# Patient Record
Sex: MALE | Race: BLACK OR AFRICAN AMERICAN | NOT HISPANIC OR LATINO | URBAN - METROPOLITAN AREA
[De-identification: names, ages, dates, MRNs, and addresses within clinical notes are randomized per-mention and may not be internally consistent; named-entity substitution may affect disease eponyms.]

---

## 2022-04-29 PROBLEM — Z00.00 ENCOUNTER FOR PREVENTIVE HEALTH EXAMINATION: Status: ACTIVE | Noted: 2022-04-29

## 2022-04-30 ENCOUNTER — INPATIENT (INPATIENT)
Facility: HOSPITAL | Age: 32
LOS: 2 days | Discharge: NURSING, REHAB OR LONG-TERM CARE | DRG: 306 | End: 2022-05-03
Attending: INTERNAL MEDICINE | Admitting: THORACIC SURGERY (CARDIOTHORACIC VASCULAR SURGERY)
Payer: MEDICAID

## 2022-04-30 VITALS
DIASTOLIC BLOOD PRESSURE: 66 MMHG | HEART RATE: 70 BPM | RESPIRATION RATE: 18 BRPM | OXYGEN SATURATION: 98 % | SYSTOLIC BLOOD PRESSURE: 123 MMHG

## 2022-04-30 LAB
ALBUMIN SERPL ELPH-MCNC: 3.7 G/DL — SIGNIFICANT CHANGE UP (ref 3.3–5)
ALP SERPL-CCNC: 71 U/L — SIGNIFICANT CHANGE UP (ref 40–120)
ALT FLD-CCNC: 109 U/L — HIGH (ref 10–45)
ANION GAP SERPL CALC-SCNC: 8 MMOL/L — SIGNIFICANT CHANGE UP (ref 5–17)
APPEARANCE UR: ABNORMAL
APTT BLD: 27.3 SEC — LOW (ref 27.5–35.5)
AST SERPL-CCNC: 72 U/L — HIGH (ref 10–40)
BASOPHILS # BLD AUTO: 0.04 K/UL — SIGNIFICANT CHANGE UP (ref 0–0.2)
BASOPHILS NFR BLD AUTO: 0.8 % — SIGNIFICANT CHANGE UP (ref 0–2)
BILIRUB SERPL-MCNC: 0.2 MG/DL — SIGNIFICANT CHANGE UP (ref 0.2–1.2)
BILIRUB UR-MCNC: NEGATIVE — SIGNIFICANT CHANGE UP
BLD GP AB SCN SERPL QL: NEGATIVE — SIGNIFICANT CHANGE UP
BLD GP AB SCN SERPL QL: NEGATIVE — SIGNIFICANT CHANGE UP
BUN SERPL-MCNC: 20 MG/DL — SIGNIFICANT CHANGE UP (ref 7–23)
CALCIUM SERPL-MCNC: 9.5 MG/DL — SIGNIFICANT CHANGE UP (ref 8.4–10.5)
CHLORIDE SERPL-SCNC: 104 MMOL/L — SIGNIFICANT CHANGE UP (ref 96–108)
CO2 SERPL-SCNC: 28 MMOL/L — SIGNIFICANT CHANGE UP (ref 22–31)
COLOR SPEC: YELLOW — SIGNIFICANT CHANGE UP
CREAT SERPL-MCNC: 1.13 MG/DL — SIGNIFICANT CHANGE UP (ref 0.5–1.3)
DIFF PNL FLD: NEGATIVE — SIGNIFICANT CHANGE UP
EGFR: 89 ML/MIN/1.73M2 — SIGNIFICANT CHANGE UP
EOSINOPHIL # BLD AUTO: 0.35 K/UL — SIGNIFICANT CHANGE UP (ref 0–0.5)
EOSINOPHIL NFR BLD AUTO: 6.8 % — HIGH (ref 0–6)
GLUCOSE SERPL-MCNC: 94 MG/DL — SIGNIFICANT CHANGE UP (ref 70–99)
GLUCOSE UR QL: NEGATIVE — SIGNIFICANT CHANGE UP
HCT VFR BLD CALC: 41.5 % — SIGNIFICANT CHANGE UP (ref 39–50)
HGB BLD-MCNC: 13.6 G/DL — SIGNIFICANT CHANGE UP (ref 13–17)
IMM GRANULOCYTES NFR BLD AUTO: 0.2 % — SIGNIFICANT CHANGE UP (ref 0–1.5)
INR BLD: 1.12 — SIGNIFICANT CHANGE UP (ref 0.88–1.16)
KETONES UR-MCNC: NEGATIVE — SIGNIFICANT CHANGE UP
LEUKOCYTE ESTERASE UR-ACNC: NEGATIVE — SIGNIFICANT CHANGE UP
LYMPHOCYTES # BLD AUTO: 1.85 K/UL — SIGNIFICANT CHANGE UP (ref 1–3.3)
LYMPHOCYTES # BLD AUTO: 36.2 % — SIGNIFICANT CHANGE UP (ref 13–44)
MCHC RBC-ENTMCNC: 28.1 PG — SIGNIFICANT CHANGE UP (ref 27–34)
MCHC RBC-ENTMCNC: 32.8 GM/DL — SIGNIFICANT CHANGE UP (ref 32–36)
MCV RBC AUTO: 85.7 FL — SIGNIFICANT CHANGE UP (ref 80–100)
MONOCYTES # BLD AUTO: 0.65 K/UL — SIGNIFICANT CHANGE UP (ref 0–0.9)
MONOCYTES NFR BLD AUTO: 12.7 % — SIGNIFICANT CHANGE UP (ref 2–14)
NEUTROPHILS # BLD AUTO: 2.21 K/UL — SIGNIFICANT CHANGE UP (ref 1.8–7.4)
NEUTROPHILS NFR BLD AUTO: 43.3 % — SIGNIFICANT CHANGE UP (ref 43–77)
NITRITE UR-MCNC: NEGATIVE — SIGNIFICANT CHANGE UP
NRBC # BLD: 0 /100 WBCS — SIGNIFICANT CHANGE UP (ref 0–0)
NT-PROBNP SERPL-SCNC: 15 PG/ML — SIGNIFICANT CHANGE UP (ref 0–300)
PH UR: 6 — SIGNIFICANT CHANGE UP (ref 5–8)
PLATELET # BLD AUTO: 170 K/UL — SIGNIFICANT CHANGE UP (ref 150–400)
POTASSIUM SERPL-MCNC: 4.2 MMOL/L — SIGNIFICANT CHANGE UP (ref 3.5–5.3)
POTASSIUM SERPL-SCNC: 4.2 MMOL/L — SIGNIFICANT CHANGE UP (ref 3.5–5.3)
PROT SERPL-MCNC: 6.9 G/DL — SIGNIFICANT CHANGE UP (ref 6–8.3)
PROT UR-MCNC: NEGATIVE MG/DL — SIGNIFICANT CHANGE UP
PROTHROM AB SERPL-ACNC: 13.3 SEC — SIGNIFICANT CHANGE UP (ref 10.5–13.4)
RBC # BLD: 4.84 M/UL — SIGNIFICANT CHANGE UP (ref 4.2–5.8)
RBC # FLD: 13.2 % — SIGNIFICANT CHANGE UP (ref 10.3–14.5)
RH IG SCN BLD-IMP: POSITIVE — SIGNIFICANT CHANGE UP
RH IG SCN BLD-IMP: POSITIVE — SIGNIFICANT CHANGE UP
SODIUM SERPL-SCNC: 140 MMOL/L — SIGNIFICANT CHANGE UP (ref 135–145)
SP GR SPEC: 1.02 — SIGNIFICANT CHANGE UP (ref 1–1.03)
TSH SERPL-MCNC: 2.97 UIU/ML — SIGNIFICANT CHANGE UP (ref 0.27–4.2)
UROBILINOGEN FLD QL: 0.2 E.U./DL — SIGNIFICANT CHANGE UP
WBC # BLD: 5.11 K/UL — SIGNIFICANT CHANGE UP (ref 3.8–10.5)
WBC # FLD AUTO: 5.11 K/UL — SIGNIFICANT CHANGE UP (ref 3.8–10.5)

## 2022-04-30 PROCEDURE — 93010 ELECTROCARDIOGRAM REPORT: CPT

## 2022-04-30 PROCEDURE — 71045 X-RAY EXAM CHEST 1 VIEW: CPT | Mod: 26

## 2022-04-30 RX ORDER — PANTOPRAZOLE SODIUM 20 MG/1
40 TABLET, DELAYED RELEASE ORAL
Refills: 0 | Status: DISCONTINUED | OUTPATIENT
Start: 2022-04-30 | End: 2022-05-03

## 2022-04-30 RX ORDER — ASPIRIN/CALCIUM CARB/MAGNESIUM 324 MG
81 TABLET ORAL DAILY
Refills: 0 | Status: DISCONTINUED | OUTPATIENT
Start: 2022-04-30 | End: 2022-05-03

## 2022-04-30 RX ORDER — SODIUM CHLORIDE 9 MG/ML
3 INJECTION INTRAMUSCULAR; INTRAVENOUS; SUBCUTANEOUS EVERY 8 HOURS
Refills: 0 | Status: DISCONTINUED | OUTPATIENT
Start: 2022-04-30 | End: 2022-05-03

## 2022-04-30 RX ORDER — MECLIZINE HCL 12.5 MG
25 TABLET ORAL EVERY 6 HOURS
Refills: 0 | Status: DISCONTINUED | OUTPATIENT
Start: 2022-04-30 | End: 2022-05-03

## 2022-04-30 RX ORDER — HEPARIN SODIUM 5000 [USP'U]/ML
5000 INJECTION INTRAVENOUS; SUBCUTANEOUS EVERY 8 HOURS
Refills: 0 | Status: DISCONTINUED | OUTPATIENT
Start: 2022-04-30 | End: 2022-05-03

## 2022-04-30 RX ADMIN — SODIUM CHLORIDE 3 MILLILITER(S): 9 INJECTION INTRAMUSCULAR; INTRAVENOUS; SUBCUTANEOUS at 22:00

## 2022-04-30 RX ADMIN — HEPARIN SODIUM 5000 UNIT(S): 5000 INJECTION INTRAVENOUS; SUBCUTANEOUS at 21:47

## 2022-04-30 NOTE — H&P ADULT - NS ATTEND AMEND GEN_ALL_CORE FT
Patient seen and examined with PA/fellow bedside and discussed during rounds.  PA/fellow note read, including vitals, physical findings, laboratory data, and radiological reports.   Revisions included below. Direct personal management at bedside and extensive interpretation of the data performed.  Plan was outlined and discussed in details with the multidisciplinary team.  Decision making of high complexity.     32 y/o M, , with no significant PMHx who presented to [facility] 7 days ago after experiencing an episode of tinnitus, dizziness, and vomiting when awaking from sleep. Pt wife called EMS who brought patient to [facility]. Reported similar event 7/8yrs ago that self resolved within mins/<1hr, however sx now persisted. Workup revealed: CT head in ED which was negative, repeat CTA H/N revealed acute infarct in left superior cerebellar artery territory, MRI confirmed acute infarct involving the left superior cerebellar artery territory, TTE/JAROD showing +PFO, nml structural heart, no thrombus. Patient remains with some mild speech difficulty, slight weakness/dyscoordinate on lt side. hospital course complicated by transaminitis after starting atorvastatin that improved upon d/c; pt admits to taking multiple OTC supplements including amio acids, protein, and creatinine. Denies travel, hx of heme, significant fhx outside of htn, palpitations. recent life event notable for birth of child, now 17d old.    -obtain imaging  -vascular neuro consultation; continue asa 81mg daily; hold statin for now  -heme evaluation w Dr. Floyd  -obtain LE u/s, carotid duplex, TTE wihout bubble study (large PFO identified on JAROD at [facility])  -will need [facility]OT upon d/c, continue telemtry  -PT/OT    I was physically present for the key portions of the evaluation and management (E/M) service provided.  I agree with the above history, physical, and plan which I have reviewed and edited where appropriate.     80 minutes spent on total encounter; more than 50% of the visit was spent counseling and/or coordinating care by the attending physician..

## 2022-04-30 NOTE — H&P ADULT - NSHPPHYSICALEXAM_GEN_ALL_CORE
PHYSICAL EXAM:  General: well appearing resting in bed in NAD   HEENT: normocephalic, atraumatic   Neurological: AOx3. Motor skills grossly intact. Coordination is diminished on left side per patient but no obvious on exam.   Cardiovascular: Normal S1/S2. Regular rate/rhythm. No murmurs  Respiratory: Lungs CTA bilaterally. No wheezing or rales  Gastrointestinal: +BS in all 4 quadrants. Non-distended. Soft. Non-tender  Extremities: Strength 5/5 b/l upper/lower extremities. Sensation grossly intact upper/lower extremities. No edema. No calf tenderness.  Vascular: Radial 2+bilaterally, DP 2+ b/l

## 2022-04-30 NOTE — H&P ADULT - ASSESSMENT
32 y/o M, , with no significant PMHx who presented to [facility] 7 days ago after experiencing an episode of tinnitus, dizziness, and vomiting when awaking from sleep. Pt wife called EMS who brought patient to [facility]. While there, patient underwent CT head in ED which was negative. However, subsequent CTA H/N revealed acute infarct in left superior cerebellar artery territory. Pt was admitted to ICU for closer monitoring. MRI confirmed acute infarct involving the left superior cerebellar artery territory. Pt also treated for vertigo symptoms. Further w/u revealed +PFO confirmed by ECHO with bubble, EF 60-65%. JAROD positive for PFO, no thrombus. Of note, pt developed transaminitis after starting atorvastatin for CVA ppx. Pt admits to taking multiple OTC supplements including amio acids, protein, and creatinine. LFTs downtrended after stopping atorvastatin. Given +PFO with CVA, pt transferred to [facility] for further evaluation for possible intervention.    Plan:    Neurovascular:   -Acute Stroke: likely 2/2 to PFO finding     -no motor deficits, but patient states that he feels he has poor coordination on the left side of the body     -confirmed by CTA head & MRI at OSH (imaging report in OSH in chart)\    -continue with ASA    -holding off on atorvastatin for now given elevated LFTs at OSH, trend here and restart once appropriate     Cardiovascular:   +PFO    -found by both TTE and JAROD at OSH    -pending repeat TTE here    -f/u further planning with Dr. Delgado     -continue with ASA   -Hemodynamically stable.   -Monitor: BP, HR, tele    Respiratory:   -Oxygenating well on room air  -Encourage continued use of IS 10x/hr and frequent ambulation  -CXR: pending admission CXR     GI:  -transaminitis at OSH: started once started atorvastatin, f/u when to restart, educate patient not to take amio acid, creatinine, and extra protein supplements.   -GI PPX: pantoprazole 40mg daily   -PO Diet: DASH/TLC   -Bowel Regimen: having regular BMs, none needed at this time     Renal / :  -Continue to monitor renal function: BUN/Cr 20/1.13   -Monitor I/O's daily     Endocrine:    -No hx of DM or thyroid dx  -A1c: pending   -TSH: pending     Hematologic:  -CBC: H/H- 13/42  -Coagulation Panel: WNL     ID:  -Temperature: afebrile   -CBC: WBC- 5.11  -Continue to observe for SIRS/Sepsis Syndrome.    Prophylaxis:  -DVT prophylaxis with 5000 SubQ Heparin q8h.  -Continue with SCD's b/l while patient is at rest     Disposition:  -Pending further w/u for possible intervention

## 2022-04-30 NOTE — PATIENT PROFILE ADULT - FALL HARM RISK - HARM RISK INTERVENTIONS
Assistance with ambulation/Assistance OOB with selected safe patient handling equipment/Communicate Risk of Fall with Harm to all staff/Discuss with provider need for PT consult/Monitor gait and stability/Reinforce activity limits and safety measures with patient and family/Tailored Fall Risk Interventions/Visual Cue: Yellow wristband and red socks/Bed in lowest position, wheels locked, appropriate side rails in place/Call bell, personal items and telephone in reach/Instruct patient to call for assistance before getting out of bed or chair/Non-slip footwear when patient is out of bed/[place] to call system/Physically safe environment - no spills, clutter or unnecessary equipment/Purposeful Proactive Rounding/Room/bathroom lighting operational, light cord in reach

## 2022-04-30 NOTE — H&P ADULT - NSHPSOCIALHISTORY_GEN_ALL_CORE
No tobacco use  ETOH: 1 drink / 2 months  Other Drugs: Marijuana use a few times monthly  Lives with wife/son in home  No assistive devices, independet with ADLs

## 2022-04-30 NOTE — H&P ADULT - HISTORY OF PRESENT ILLNESS
30 y/o M, , with no significant PMHx who presented to [facility] 7 days ago after experiencing an episode of tinnitus, dizziness, and vomiting when awaking from sleep. Patient's wife called EMS who brought patient to [facility]. While there, patient underwent CT head in ED which was negative. However, subsequent CTA H/N revealed acute infarct in left superior cerebellar artery territory. Pt was admitted to ICU for closer monitoring. MRI confirmed acute infarct involving the left superior cerebellar artery territory. Pt also treated for vertigo symptoms. Further w/u revealed +PFO confirmed by ECHO with bubble, EF 60-65%. Transitioned to tele floor. Neurology following patient and recommended JAROD which was positive for PFO, no thrombus. Of note, pt developed transaminitis after starting atorvastatin for CVA ppx. Pt admits to taking multiple OTC supplements including amio acids, protein, and creatinine. LFTs downtrended after stopping atorvastatin. Given +PFO with CVA, pt transferred to [facility] for further evaluation for possible intervention. On arrival, pt states he still feels he has residual left sided coordination both with holding objects and with walking but normal strength. Otherwise, pt feels well and denies any CP, palpitations, SOB, wheezing, abd pain, n/v/d/c, fevers or chills.     ICU Vital Signs Last 24 Hrs  T(C): 36.9 (30 Apr 2022 18:16), Max: 36.9 (30 Apr 2022 18:16)  T(F): 98.4 (30 Apr 2022 18:16), Max: 98.4 (30 Apr 2022 18:16)  HR: --  BP: --  BP(mean): --  ABP: --  ABP(mean): --  RR: --  SpO2: --

## 2022-04-30 NOTE — H&P ADULT - NSHPREVIEWOFSYSTEMS_GEN_ALL_CORE
Review of Systems  CONSTITUTIONAL:  Denies Fevers / chills, sweats, fatigue, weight loss, weight gain                                      NEURO:  +coordination changes, Denies changes in sensation, seizures, syncope, confusion                                                  EYES:  Denies Blurry vision, discharge, pain, loss of vision                                                                                    ENMT:  Denies Difficulty hearing, vertigo, dysphagia, epistaxis, recent dental work                                       CV:  Denies Chest pain, palpitations, REYNA, orthopnea                                                                                          RESPIRATORY:  Denies Wheezing, SOB, cough / sputum, hemoptysis                                                                GI:  Denies Nausea, vomiting, diarrhea, constipation, melena, difficulty swallowing                                               : Denies Hematuria, dysuria, urgency, incontinence                                                                                         MUSCULOSKELETAL:  Denies arthritis, joint swelling, muscle weakness                                                             SKIN/BREAST:  Denies rash, itching, hair loss, masses                                                                                            PSYCH:  Denies depression, anxiety, suicidal ideation                                                                                               HEME/LYMPH:  Denies bruises easily, enlarged lymph nodes, tender lymph nodes                                        ENDOCRINE:  Denies cold intolerance, heat intolerance, polydipsia

## 2022-05-01 DIAGNOSIS — I63.9 CEREBRAL INFARCTION, UNSPECIFIED: ICD-10-CM

## 2022-05-01 DIAGNOSIS — Q21.1 ATRIAL SEPTAL DEFECT: ICD-10-CM

## 2022-05-01 LAB
ALBUMIN SERPL ELPH-MCNC: 3.7 G/DL — SIGNIFICANT CHANGE UP (ref 3.3–5)
ALP SERPL-CCNC: 66 U/L — SIGNIFICANT CHANGE UP (ref 40–120)
ALT FLD-CCNC: 103 U/L — HIGH (ref 10–45)
ANION GAP SERPL CALC-SCNC: 8 MMOL/L — SIGNIFICANT CHANGE UP (ref 5–17)
AST SERPL-CCNC: 59 U/L — HIGH (ref 10–40)
BASOPHILS # BLD AUTO: 0.05 K/UL — SIGNIFICANT CHANGE UP (ref 0–0.2)
BASOPHILS NFR BLD AUTO: 0.9 % — SIGNIFICANT CHANGE UP (ref 0–2)
BILIRUB SERPL-MCNC: 0.3 MG/DL — SIGNIFICANT CHANGE UP (ref 0.2–1.2)
BUN SERPL-MCNC: 15 MG/DL — SIGNIFICANT CHANGE UP (ref 7–23)
CALCIUM SERPL-MCNC: 9.4 MG/DL — SIGNIFICANT CHANGE UP (ref 8.4–10.5)
CHLORIDE SERPL-SCNC: 103 MMOL/L — SIGNIFICANT CHANGE UP (ref 96–108)
CO2 SERPL-SCNC: 29 MMOL/L — SIGNIFICANT CHANGE UP (ref 22–31)
CREAT SERPL-MCNC: 1.15 MG/DL — SIGNIFICANT CHANGE UP (ref 0.5–1.3)
EGFR: 87 ML/MIN/1.73M2 — SIGNIFICANT CHANGE UP
EOSINOPHIL # BLD AUTO: 0.41 K/UL — SIGNIFICANT CHANGE UP (ref 0–0.5)
EOSINOPHIL NFR BLD AUTO: 7.6 % — HIGH (ref 0–6)
GLUCOSE SERPL-MCNC: 93 MG/DL — SIGNIFICANT CHANGE UP (ref 70–99)
HCT VFR BLD CALC: 39.3 % — SIGNIFICANT CHANGE UP (ref 39–50)
HGB BLD-MCNC: 13.2 G/DL — SIGNIFICANT CHANGE UP (ref 13–17)
IMM GRANULOCYTES NFR BLD AUTO: 0.4 % — SIGNIFICANT CHANGE UP (ref 0–1.5)
LYMPHOCYTES # BLD AUTO: 1.65 K/UL — SIGNIFICANT CHANGE UP (ref 1–3.3)
LYMPHOCYTES # BLD AUTO: 30.7 % — SIGNIFICANT CHANGE UP (ref 13–44)
MCHC RBC-ENTMCNC: 28.4 PG — SIGNIFICANT CHANGE UP (ref 27–34)
MCHC RBC-ENTMCNC: 33.6 GM/DL — SIGNIFICANT CHANGE UP (ref 32–36)
MCV RBC AUTO: 84.7 FL — SIGNIFICANT CHANGE UP (ref 80–100)
MONOCYTES # BLD AUTO: 0.64 K/UL — SIGNIFICANT CHANGE UP (ref 0–0.9)
MONOCYTES NFR BLD AUTO: 11.9 % — SIGNIFICANT CHANGE UP (ref 2–14)
NEUTROPHILS # BLD AUTO: 2.6 K/UL — SIGNIFICANT CHANGE UP (ref 1.8–7.4)
NEUTROPHILS NFR BLD AUTO: 48.5 % — SIGNIFICANT CHANGE UP (ref 43–77)
NRBC # BLD: 0 /100 WBCS — SIGNIFICANT CHANGE UP (ref 0–0)
PLATELET # BLD AUTO: 165 K/UL — SIGNIFICANT CHANGE UP (ref 150–400)
POTASSIUM SERPL-MCNC: 4 MMOL/L — SIGNIFICANT CHANGE UP (ref 3.5–5.3)
POTASSIUM SERPL-SCNC: 4 MMOL/L — SIGNIFICANT CHANGE UP (ref 3.5–5.3)
PROT SERPL-MCNC: 6.6 G/DL — SIGNIFICANT CHANGE UP (ref 6–8.3)
RBC # BLD: 4.64 M/UL — SIGNIFICANT CHANGE UP (ref 4.2–5.8)
RBC # FLD: 13.4 % — SIGNIFICANT CHANGE UP (ref 10.3–14.5)
SARS-COV-2 RNA SPEC QL NAA+PROBE: SIGNIFICANT CHANGE UP
SODIUM SERPL-SCNC: 140 MMOL/L — SIGNIFICANT CHANGE UP (ref 135–145)
WBC # BLD: 5.37 K/UL — SIGNIFICANT CHANGE UP (ref 3.8–10.5)
WBC # FLD AUTO: 5.37 K/UL — SIGNIFICANT CHANGE UP (ref 3.8–10.5)

## 2022-05-01 PROCEDURE — 99253 IP/OBS CNSLTJ NEW/EST LOW 45: CPT | Mod: GC

## 2022-05-01 PROCEDURE — 99221 1ST HOSP IP/OBS SF/LOW 40: CPT

## 2022-05-01 RX ADMIN — SODIUM CHLORIDE 3 MILLILITER(S): 9 INJECTION INTRAMUSCULAR; INTRAVENOUS; SUBCUTANEOUS at 06:01

## 2022-05-01 RX ADMIN — SODIUM CHLORIDE 3 MILLILITER(S): 9 INJECTION INTRAMUSCULAR; INTRAVENOUS; SUBCUTANEOUS at 14:36

## 2022-05-01 RX ADMIN — HEPARIN SODIUM 5000 UNIT(S): 5000 INJECTION INTRAVENOUS; SUBCUTANEOUS at 21:50

## 2022-05-01 RX ADMIN — HEPARIN SODIUM 5000 UNIT(S): 5000 INJECTION INTRAVENOUS; SUBCUTANEOUS at 14:26

## 2022-05-01 RX ADMIN — SODIUM CHLORIDE 3 MILLILITER(S): 9 INJECTION INTRAMUSCULAR; INTRAVENOUS; SUBCUTANEOUS at 21:51

## 2022-05-01 RX ADMIN — PANTOPRAZOLE SODIUM 40 MILLIGRAM(S): 20 TABLET, DELAYED RELEASE ORAL at 06:01

## 2022-05-01 RX ADMIN — HEPARIN SODIUM 5000 UNIT(S): 5000 INJECTION INTRAVENOUS; SUBCUTANEOUS at 06:01

## 2022-05-01 RX ADMIN — Medication 81 MILLIGRAM(S): at 12:13

## 2022-05-01 NOTE — CONSULT NOTE ADULT - SUBJECTIVE AND OBJECTIVE BOX
HPI:  30 y/o M, , with no significant PMHx who presented to [facility] 7 days ago after experiencing an episode of tinnitus, dizziness, and vomiting when awaking from sleep. Patient's wife called EMS who brought patient to [facility]. While there, patient underwent CT head in ED which was negative. However, subsequent CTA H/N revealed acute infarct in left superior cerebellar artery territory. Pt was admitted to ICU for closer monitoring. MRI confirmed acute infarct involving the left superior cerebellar artery territory. Pt also treated for vertigo symptoms. Further w/u revealed +PFO confirmed by ECHO with bubble, EF 60-65%. Transitioned to tele floor. Neurology following patient and recommended JAROD which was positive for PFO, no thrombus. Of note, pt developed transaminitis after starting atorvastatin for CVA ppx. Pt admits to taking multiple OTC supplements including amio acids, protein, and creatinine. LFTs downtrended after stopping atorvastatin. Given +PFO with CVA, pt transferred to [facility] for further evaluation for possible intervention. On arrival, pt states he still feels he has residual left sided coordination both with holding objects and with walking but normal strength. Otherwise, pt feels well and denies any CP, palpitations, SOB, wheezing, abd pain, n/v/d/c, fevers or chills.     ICU Vital Signs Last 24 Hrs  T(C): 36.9 (2022 18:16), Max: 36.9 (2022 18:16)  T(F): 98.4 (2022 18:16), Max: 98.4 (2022 18:16)  HR: --  BP: --  BP(mean): --  ABP: --  ABP(mean): --  RR: --  SpO2: --   (2022 18:21)      PAST MEDICAL & SURGICAL HISTORY:  No pertinent past medical history    No significant past surgical history         Review of Systems:  · General	negative  · Skin/Breast	negative  · Ophthalmologic	negative  · ENMT	negative  · Respiratory and Thorax	negative  · Cardiovascular	negative  · Gastrointestinal	negative  · Genitourinary	negative  · Musculoskeletal Comments	negative  · Neurological	negative      MEDICATIONS  (STANDING):  aspirin enteric coated 81 milliGRAM(s) Oral daily  heparin   Injectable 5000 Unit(s) SubCutaneous every 8 hours  pantoprazole    Tablet 40 milliGRAM(s) Oral before breakfast  sodium chloride 0.9% lock flush 3 milliLiter(s) IV Push every 8 hours    MEDICATIONS  (PRN):  meclizine 25 milliGRAM(s) Oral every 6 hours PRN Dizziness      Allergies    Kiwi (Unknown)  No Known Drug Allergies  peanuts (Unknown)    Intolerances        SOCIAL HISTORY:    FAMILY HISTORY:  No pertinent family history in first degree relatives        Vital Signs Last 24 Hrs  T(C): 37.1 (01 May 2022 18:16), Max: 37.1 (01 May 2022 18:16)  T(F): 98.7 (01 May 2022 18:16), Max: 98.7 (01 May 2022 18:16)  HR: 68 (01 May 2022 18:15) (62 - 75)  BP: 119/55 (01 May 2022 18:15) (114/67 - 133/64)  BP(mean): --  RR: 16 (01 May 2022 18:15) (16 - 18)  SpO2: 97% (01 May 2022 18:15) (97% - 98%)     Physical Exam:  · Constitutional	detailed exam  · Constitutional Details	well-developed; well-groomed  · Eyes	EOMI; PERRL; no drainage or redness  · ENMT Comments	dry mucous membranes  · Respiratory	detailed exam  · Respiratory Comments	normal breath sounds at the lung bases bilaterally  · Cardiovascular	Regular rate & rhythm, normal S1, S2; no murmurs, gallops or rubs; no S3, S4  · Abd-Soft non tender  ·Ext-no edema, clubbing or cyanosis      LABS:                        13.2   5.37  )-----------( 165      ( 01 May 2022 07:08 )             39.3     05-    140  |  103  |  15  ----------------------------<  93  4.0   |  29  |  1.15    Ca    9.4      01 May 2022 07:08    TPro  6.6  /  Alb  3.7  /  TBili  0.3  /  DBili  x   /  AST  59<H>  /  ALT  103<H>  /  AlkPhos  66  05-01    PT/INR - ( 2022 18:10 )   PT: 13.3 sec;   INR: 1.12          PTT - ( 2022 18:10 )  PTT:27.3 sec  Urinalysis Basic - ( 2022 20:24 )    Color: Yellow / Appearance: SL Cloudy / S.020 / pH: x  Gluc: x / Ketone: NEGATIVE  / Bili: Negative / Urobili: 0.2 E.U./dL   Blood: x / Protein: NEGATIVE mg/dL / Nitrite: NEGATIVE   Leuk Esterase: NEGATIVE / RBC: x / WBC x   Sq Epi: x / Non Sq Epi: x / Bacteria: x        RADIOLOGY & ADDITIONAL STUDIES:

## 2022-05-01 NOTE — OCCUPATIONAL THERAPY INITIAL EVALUATION ADULT - DIAGNOSIS, OT EVAL
MRS 4. Pt p/w slight facial dropping (decrease L cheeck puff) reporting slight ringing or icnrease HA when looking left, demonstrating decrease coordination ( L UE, LE) balance and strength affecting ADLs and functional mobility.

## 2022-05-01 NOTE — PROGRESS NOTE ADULT - SUBJECTIVE AND OBJECTIVE BOX
Patient discussed on morning rounds with Dr. Delgado    Pre-op Evaluation for Possible PFO closure     SUBJECTIVE ASSESSMENT:  Pt feels well today, no complaints. Eating/drinking well. Moving bowels regularly. Denies any CP, palpitations, SOB, wheezing, abd pain, n/v/d/c, fevers or chills.    Vital Signs Last 24 Hrs  T(C): 37.1 (01 May 2022 18:16), Max: 37.1 (01 May 2022 18:16)  T(F): 98.7 (01 May 2022 18:16), Max: 98.7 (01 May 2022 18:16)  HR: 68 (01 May 2022 18:15) (62 - 75)  BP: 119/55 (01 May 2022 18:15) (114/67 - 133/64)  BP(mean): --  RR: 16 (01 May 2022 18:15) (16 - 18)  SpO2: 97% (01 May 2022 18:15) (97% - 98%)  I&O's Detail    2022 07:01  -  01 May 2022 07:00  --------------------------------------------------------  IN:    Oral Fluid: 240 mL  Total IN: 240 mL    OUT:    Voided (mL): 400 mL  Total OUT: 400 mL    Total NET: -160 mL      01 May 2022 07:01  -  01 May 2022 20:04  --------------------------------------------------------  IN:    Oral Fluid: 240 mL  Total IN: 240 mL    OUT:    Voided (mL): 600 mL  Total OUT: 600 mL    Total NET: -360 mL    CHEST TUBE:  no  RAUL DRAIN:  no  EPICARDIAL WIRES: no  TIE DOWNS: no  HAMILTON: no    Physical Exam:  General: well appearing resting in bed in NAD   HEENT: normocephalic, atraumatic   Neurological: AOx3. Motor skills grossly intact. Coordination is diminished on left side per patient but no obvious on exam.   Cardiovascular: Normal S1/S2. Regular rate/rhythm. No murmurs  Respiratory: Lungs CTA bilaterally. No wheezing or rales  Gastrointestinal: +BS in all 4 quadrants. Non-distended. Soft. Non-tender  Extremities: Strength 5/5 b/l upper/lower extremities. Sensation grossly intact upper/lower extremities. No edema. No calf tenderness.  Vascular: Radial 2+bilaterally, DP 2+ b/l    LABS:                        13.2   5.37  )-----------( 165      ( 01 May 2022 07:08 )             39.3       COUMADIN:  no    PT/INR - ( 2022 18:10 )   PT: 13.3 sec;   INR: 1.12    PTT - ( 2022 18:10 )  PTT:27.3 sec        140  |  103  |  15  ----------------------------<  93  4.0   |  29  |  1.15    Ca    9.4      01 May 2022 07:08    TPro  6.6  /  Alb  3.7  /  TBili  0.3  /  DBili  x   /  AST  59<H>  /  ALT  103<H>  /  AlkPhos  66  05-      Urinalysis Basic - ( 2022 20:24 )    Color: Yellow / Appearance: SL Cloudy / S.020 / pH: x  Gluc: x / Ketone: NEGATIVE  / Bili: Negative / Urobili: 0.2 E.U./dL   Blood: x / Protein: NEGATIVE mg/dL / Nitrite: NEGATIVE   Leuk Esterase: NEGATIVE / RBC: x / WBC x   Sq Epi: x / Non Sq Epi: x / Bacteria: x        MEDICATIONS  (STANDING):  aspirin enteric coated 81 milliGRAM(s) Oral daily  heparin   Injectable 5000 Unit(s) SubCutaneous every 8 hours  pantoprazole    Tablet 40 milliGRAM(s) Oral before breakfast  sodium chloride 0.9% lock flush 3 milliLiter(s) IV Push every 8 hours    MEDICATIONS  (PRN):  meclizine 25 milliGRAM(s) Oral every 6 hours PRN Dizziness    RADIOLOGY & ADDITIONAL TESTS:  < from: Xray Chest 1 View AP/PA (22 @ 18:49) >  IMPRESSION: No acute infiltrates  < end of copied text >

## 2022-05-01 NOTE — PROGRESS NOTE ADULT - ASSESSMENT
32 y/o M, , with no significant PMHx who presented to [facility] 7 days ago after experiencing an episode of tinnitus, dizziness, and vomiting when awaking from sleep. Pt wife called EMS who brought patient to [facility]. While there, patient underwent CT head in ED which was negative. However, subsequent CTA H/N revealed acute infarct in left superior cerebellar artery territory. Pt was admitted to ICU for closer monitoring. MRI confirmed acute infarct involving the left superior cerebellar artery territory. Pt also treated for vertigo symptoms. Further w/u revealed +PFO confirmed by ECHO with bubble, EF 60-65%. JAROD positive for PFO, no thrombus. Of note, pt developed transaminitis after starting atorvastatin for CVA ppx. Pt admits to taking multiple OTC supplements including amio acids, protein, and creatinine. LFTs downtrended after stopping atorvastatin. Given +PFO with CVA, pt transferred to [facility] for further evaluation for possible intervention.    Plan:  Neurovascular:   -Acute CVA: possibly 2/2 to PFO finding     -pending w/u to r/o other causes: hypercoagulability w/u ordered, vascular consulted but pending carotid duplex first to r/o carotid dx     -no motor deficits, but patient states that he feels he has poor coordination on the left side of the body     -confirmed by CTA head & MRI at OSH (imaging report in OSH in chart)\    -continue with ASA    -Consulted stroke team, appreciate recommendations     -recommending ASA, plavix, and statin but per Dr. Delgado will follow up regarding plavix and statin Marcello     Cardiovascular:   +PFO    -found by both TTE and JAROD at OSH    -pending repeat TTE here w/o bubble (ordered)    -likely outpatient PFO closure     -f/u further planning with Dr. Delgado     -continue with ASA   -Hemodynamically stable.   -Monitor: BP, HR, tele    Respiratory:   -Oxygenating well on room air  -Encourage continued use of IS 10x/hr and frequent ambulation  -CXR: pending admission CXR     GI:  -transaminitis at OSH: started once started atorvastatin, f/u when to restart, educate patient not to take amio acid, creatinine, and extra protein supplements.   -GI PPX: pantoprazole 40mg daily   -PO Diet: DASH/TLC   -Bowel Regimen: having regular BMs, none needed at this time     Renal / :  -Continue to monitor renal function: BUN/Cr 15/1.15  -Monitor I/O's daily     Endocrine:    -No hx of DM or thyroid dx  -A1c: pending   -TSH: pending     Hematologic:  -CBC: H/H- 13/39  -Coagulation Panel: WNL     ID:  -Temperature: afebrile   -CBC: WBC- 5.3  -Continue to observe for SIRS/Sepsis Syndrome.    Prophylaxis:  -DVT prophylaxis with 5000 SubQ Heparin q8h.  -Continue with SCD's b/l while patient is at rest     Disposition:  -Pending further w/u for possible intervention  32 y/o M, , with no significant PMHx who presented to [facility] 7 days ago after experiencing an episode of tinnitus, dizziness, and vomiting when awaking from sleep. Pt wife called EMS who brought patient to [facility]. While there, patient underwent CT head in ED which was negative. However, subsequent CTA H/N revealed acute infarct in left superior cerebellar artery territory. Pt was admitted to ICU for closer monitoring. MRI confirmed acute infarct involving the left superior cerebellar artery territory. Pt also treated for vertigo symptoms. Further w/u revealed +PFO confirmed by ECHO with bubble, EF 60-65%. JAROD positive for PFO, no thrombus. Of note, pt developed transaminitis after starting atorvastatin for CVA ppx. Pt admits to taking multiple OTC supplements including amio acids, protein, and creatinine. LFTs downtrended after stopping atorvastatin. Given +PFO with CVA, pt transferred to [facility] for further evaluation for possible intervention.    Plan:  Neurovascular:   -Acute CVA: possibly 2/2 to PFO finding     -pending w/u to r/o other causes: hypercoagulability w/u ordered, vascular consulted but pending carotid duplex first to r/o carotid dx     -no motor deficits, but patient states that he feels he has poor coordination on the left side of the body     -confirmed by CTA head & MRI at OSH (imaging report in OSH in chart)    -continue with ASA    -Consulted stroke team, appreciate recommendations     -recommending ASA, plavix, and statin but per Dr. Delgado will follow up regarding plavix and statin Marcello     Cardiovascular:   +PFO    -found by both TTE and JAROD at OSH    -pending repeat TTE here w/o bubble (ordered)    -likely outpatient PFO closure     -f/u further planning with Dr. Delgado     -continue with ASA   -Hemodynamically stable.   -Monitor: BP, HR, tele    Respiratory:   -Oxygenating well on room air  -Encourage continued use of IS 10x/hr and frequent ambulation  -CXR: pending admission CXR     GI:  -transaminitis at OSH: started once started atorvastatin, f/u when to restart, educate patient not to take amio acid, creatinine, and extra protein supplements.   -GI PPX: pantoprazole 40mg daily   -PO Diet: DASH/TLC   -Bowel Regimen: having regular BMs, none needed at this time     Renal / :  -Continue to monitor renal function: BUN/Cr 15/1.15  -Monitor I/O's daily     Endocrine:    -No hx of DM or thyroid dx  -A1c: pending   -TSH: pending     Hematologic:  -CBC: H/H- 13/39  -Coagulation Panel: WNL     ID:  -Temperature: afebrile   -CBC: WBC- 5.3  -Continue to observe for SIRS/Sepsis Syndrome.    Prophylaxis:  -DVT prophylaxis with 5000 SubQ Heparin q8h.  -Continue with SCD's b/l while patient is at rest     Disposition:  -Pending further w/u for possible intervention

## 2022-05-01 NOTE — CONSULT NOTE ADULT - SUBJECTIVE AND OBJECTIVE BOX
Vascular Attending:  Chuck      HPI:  30 y/o M, , with no significant PMHx who presented to [facility] 7 days ago after experiencing an episode of tinnitus, dizziness, and vomiting when awaking from sleep. Patient's wife called EMS who brought patient to [facility]. While there, patient underwent CT head in ED which was negative. However, subsequent CTA H/N revealed acute infarct in left superior cerebellar artery territory. Pt was admitted to ICU for closer monitoring. MRI confirmed acute infarct involving the left superior cerebellar artery territory. Pt also treated for vertigo symptoms. Further w/u revealed +PFO confirmed by ECHO with bubble, EF 60-65%. Transitioned to tele floor. Neurology following patient and recommended JAROD which was positive for PFO, no thrombus. Of note, pt developed transaminitis after starting atorvastatin for CVA ppx. Pt admits to taking multiple OTC supplements including amio acids, protein, and creatinine. LFTs downtrended after stopping atorvastatin. Given +PFO with CVA, pt transferred to [facility] for further evaluation for possible intervention. On arrival, pt states he still feels he has residual left sided coordination both with holding objects and with walking but normal strength. Otherwise, pt feels well and denies any CP, palpitations, SOB, wheezing, abd pain, n/v/d/c, fevers or chills.     Vascular Addendum:   Patient claims that he had a similar episode of tinnitus, dizziness and nausea about 8 years ago that resolved after about 3 hours, and was not associated with trouble with walking/coordination or changes in his speech as with this episode. He says his father has chronic vertigo but he denies any family history of strokes, blood clots, bleeding disorders, trauma, long travel. He also says that he had a LE Duplex US during his stay at [facility] that was negative for DVT.       PAST MEDICAL & SURGICAL HISTORY:  No pertinent past medical history    No significant past surgical history      MEDICATIONS  (STANDING):  aspirin enteric coated 81 milliGRAM(s) Oral daily  heparin   Injectable 5000 Unit(s) SubCutaneous every 8 hours  pantoprazole    Tablet 40 milliGRAM(s) Oral before breakfast  sodium chloride 0.9% lock flush 3 milliLiter(s) IV Push every 8 hours    MEDICATIONS  (PRN):  meclizine 25 milliGRAM(s) Oral every 6 hours PRN Dizziness      Allergies    Kiwi (Unknown)  No Known Drug Allergies  peanuts (Unknown)    Intolerances        SOCIAL HISTORY:    FAMILY HISTORY:  No pertinent family history in first degree relatives        Vital Signs Last 24 Hrs  T(C): 36.4 (01 May 2022 10:00), Max: 37 (01 May 2022 06:50)  T(F): 97.5 (01 May 2022 10:00), Max: 98.6 (01 May 2022 06:50)  HR: 75 (01 May 2022 14:15) (62 - 75)  BP: 133/64 (01 May 2022 14:15) (114/67 - 133/64)  BP(mean): --  RR: 16 (01 May 2022 14:15) (16 - 18)  SpO2: 97% (01 May 2022 14:15) (97% - 98%)    PHYSICAL EXAM:    General: well appearing resting in bed in NAD   HEENT: normocephalic, atraumatic   Neurological: AOx3. Motor skills grossly intact. Coordination is diminished on left side per patient but no obvious on exam.   Cardiovascular: Normal S1/S2. Regular rate/rhythm. No murmurs  Respiratory: Lungs CTA bilaterally. No wheezing or rales  Gastrointestinal: +BS in all 4 quadrants. Non-distended. Soft. Non-tender  Extremities: Strength 5/5 b/l upper/lower extremities. Sensation grossly intact upper/lower extremities. No edema. No calf tenderness.  Vascular: Radial 2+bilaterally, DP/PT 2+ No carotid bruits  Neuro: Awake, alert, oriented to person, place, and time. Speech is fluent with intact naming, repetition, and comprehension, no dysarthria, + stuttering speech when speaking longer sentences at times. Dysmetria present on Left side    LABS:                        13.2   5.37  )-----------( 165      ( 01 May 2022 07:08 )             39.3     05-    140  |  103  |  15  ----------------------------<  93  4.0   |  29  |  1.15    Ca    9.4      01 May 2022 07:08    TPro  6.6  /  Alb  3.7  /  TBili  0.3  /  DBili  x   /  AST  59<H>  /  ALT  103<H>  /  AlkPhos  66  05-    PT/INR - ( 2022 18:10 )   PT: 13.3 sec;   INR: 1.12          PTT - ( 2022 18:10 )  PTT:27.3 sec  Urinalysis Basic - ( 2022 20:24 )    Color: Yellow / Appearance: SL Cloudy / S.020 / pH: x  Gluc: x / Ketone: NEGATIVE  / Bili: Negative / Urobili: 0.2 E.U./dL   Blood: x / Protein: NEGATIVE mg/dL / Nitrite: NEGATIVE   Leuk Esterase: NEGATIVE / RBC: x / WBC x   Sq Epi: x / Non Sq Epi: x / Bacteria: x        RADIOLOGY & ADDITIONAL STUDIES

## 2022-05-01 NOTE — CONSULT NOTE ADULT - ASSESSMENT
31y Male, , with no PMHx presented to [facility] on 4/23 after experiencing an episode of acute R ear tinnitus, dizziness, and vomiting when awaking from sleep, found to have acute to subacute left SCA infarct on CTA h/n and MRI with residual left sided dysmetria and ataxia, c/b transaminitis after starting statin, transferred to [facility] under CTSx for +PFO found on JAROD. Stroke consulted for further management.     Recommend:   1)Secondary stroke prevention  - continue ASA 81mg PO daily and start Plavix 75mg PO daily if not planned for PFO repair this admission  - start Atorvastatin 80mg PO daily if LFTs are < x3 the upper limit as pt is still within subacute stroke timeline. can recheck LFTs 2 days after initiating treatment to monitor LFTs.     2) Stroke risk factors  - A1C: pending  - LDL: please obtain  - T[facility]: 3.049 [facility]    3) Further management  - please obtain [facility] imaging  - [facility] MRI brain without contrast 4/24: acute to subacute infarct in left cerebellum   - recommend SBP goal <160  - recommend q4hr stroke neuro checks  - please obtain hypercoagulable panel  - may need outpt neurology follow up  - may require outpt heart monitor  - provide stroke education    DVT prophylaxis   -Lovenox SQ and SCDs    Discussed with Neurology Attending Dr. Chin 31y Male, , with no PMHx presented to [facility] on 4/23 after experiencing an episode of acute R ear tinnitus, dizziness, and vomiting when awaking from sleep, found to have acute to subacute left SCA infarct on CTA h/n and MRI with residual left sided dysmetria and ataxia, c/b transaminitis after starting statin, transferred to [facility] under CTSx for +PFO found on JAROD. Stroke consulted for further management.     Recommend:   1)Secondary stroke prevention  - continue ASA 81mg PO daily and start Plavix 75mg PO daily if not planned for PFO repair this admission  - start Atorvastatin 80mg PO daily if LFTs are < x3 the upper limit as pt is still within subacute stroke timeline. can recheck LFTs 2 days after initiating treatment to monitor LFTs.     2) Stroke risk factors  - A1C: pending  - LDL: please obtain  - TSH: 3.049 @[facility]    3) Further management  - please obtain [facility] imaging  - [facility] MRI brain without contrast 4/24: acute to subacute infarct in left cerebellum   - recommend SBP goal <160  - recommend q4hr stroke neuro checks  - JAROD @ [facility]: EF 60-65%, +PFO, no thrombus   - please obtain hypercoagulable panel - of note per [facility] chart, protein c/s and antithrombin III negative.   - may need outpt neurology follow up  - may require outpt heart monitor  - provide stroke education    DVT prophylaxis   -Lovenox SQ and SCDs    Discussed with Neurology Attending Dr. Chin

## 2022-05-01 NOTE — OCCUPATIONAL THERAPY INITIAL EVALUATION ADULT - MD ORDER
31y Male, , with no PMHx presented to [facility] on 4/23 after experiencing an episode of acute R ear tinnitus, dizziness, and vomiting when awaking from sleep. Patient underwent CTH in ED which was negative, CTA H/N showed acute left SCA infarct, was admitted to ICU for close monitoring. MRI on 4/24 confirmed acute to subacute stroke in left cerebellum

## 2022-05-01 NOTE — CONSULT NOTE ADULT - ATTENDING COMMENTS
Case reviewed with Chief Resident.  Will obtain carotid duplex and r/o for DVT.  Management as per primary team.

## 2022-05-01 NOTE — CONSULT NOTE ADULT - ASSESSMENT
31y Male, , with no PMHx presented to [facility] on 4/23 after experiencing an episode of acute R ear tinnitus, dizziness, and vomiting when awaking from sleep, found to have acute to subacute left SCA infarct on CTA h/n and MRI with residual left sided dysmetria and ataxia, c/b transaminitis after starting statin, transferred to [facility] under CTSx for +PFO found on JAROD. Vascular Surgery consulted for further management.     Plan:   f/u Bilateral Carotid US (pending)  f/u Bilateral LE Venous Duplex US (pending)  No acute vascular intervention at this time  Vascular will continue to follow

## 2022-05-01 NOTE — OCCUPATIONAL THERAPY INITIAL EVALUATION ADULT - LIVES WITH, PROFILE
Pt lives with his wife and new born son. Pt at baseline is ind for ADLs and functional mobility. No DME needed./children/spouse

## 2022-05-01 NOTE — OCCUPATIONAL THERAPY INITIAL EVALUATION ADULT - MODALITIES TREATMENT COMMENTS
Cranial Nerves II - XII: II: PERRLA; visual fields are full to confrontation III, IV, VI: EOMI, no nystagmus appreciated V: facial sensation intact to light touch V1-V3 b/l VII: no ptosis, no facial droop, symmetric eyebrow raise and closure VIII: hearing intact to finger rub b/l  XI: head turning and shoulder shrug intact b/l XII: tongue protrusion midline. Pt performed functional mobility ~40ftx2 with Min Ax1 to assist with weight shifting 2/2 decrease coordination and balance

## 2022-05-01 NOTE — CONSULT NOTE ADULT - SUBJECTIVE AND OBJECTIVE BOX
**STROKE CONSULT NOTE**    HPI: 31y Male, , with no PMHx presented to [facility] on  after experiencing an episode of acute R ear tinnitus, dizziness, and vomiting when awaking from sleep. States he woke up with ringing in his right ear that got progressively louder, rolled over towards his wife, never rolled back over which was odd to his wife. Pt then had sudden episode of room-spinning sensation, was unable to sit up and get out of bed. Pt had 1 episode of emesis which prompted his wife to call EMS, was brought to [facility] ED. Patient underwent CTH in ED which was negative, CTA H/N showed acute left SCA infarct, was admitted to ICU for close monitoring. MRI on  confirmed acute to subacute stroke in left cerebellum. Pt was started on aspirin 81mg, atorvastatin 40mg, and meclizine. JAROD with bubble performed which showed + PFO , EF 60-65%, no thrombus. Pt stepped down to telemetry floor. Course c/b transaminitis after starting atorvastatin. Pt admits to taking multiple OTC supplements including creatine, pre workout, protein powder, and branched chain amino acids. LFTs downtrended after stopping atorvastatin. Given +PFO with CVA, pt transferred to [facility] for further evaluation for possible intervention with CTSx. Stroke consulted for further management.     Patient states he no longer has room spinning sensation but still feels lightheaded at times. Also states he had headaches until  but have since resolved. Pt notes that he sometimes has difficulties getting longer words out and takes extra effort. Endorses that his left side feels "off," at times has difficulty reaching for objects with his left arm and has difficulty walking as well. Pt denies any current headaches, visual disturbances, slurred speech, facial asymmetry, chest pain, sob, palpitations, abd pain, n/v/d, extremity weakness/numbness.     T(C): 36.4 (22 @ 10:00), Max: 37 (22 @ 06:50)  HR: 67 (22 @ 06:50) (62 - 70)  BP: 114/67 (22 @ 06:50) (114/67 - 126/64)  RR: 16 (22 @ 06:50) (16 - 18)  SpO2: 97% (22 @ 06:50) (97% - 98%)    PAST MEDICAL & SURGICAL HISTORY:  No pertinent past medical history    No significant past surgical history    FAMILY HISTORY:  No pertinent family history in first degree relatives    SOCIAL HISTORY:   Patient lives with his wife and  son  Smoking status: denies tobacco smoking  Drinkin drink x 2 months  Drug Use: marijuana smoking a few times a month, otherwise denies    ROS:   Constitutional: No fever, weight loss or fatigue  Eyes: No eye pain, visual disturbances, or discharge  ENMT:  No difficulty hearing, tinnitus; No sinus or throat pain  Neck: No pain or stiffness  Respiratory: No cough, wheezing, chills or hemoptysis  Cardiovascular: No chest pain, palpitations, shortness of breath, or leg swelling  Gastrointestinal: No abdominal pain. No nausea, vomiting or hematemesis; No diarrhea or constipation.   Genitourinary: No dysuria, frequency, hematuria or incontinence  Neurological: As per HPI    MEDICATIONS  (STANDING):  aspirin enteric coated 81 milliGRAM(s) Oral daily  heparin   Injectable 5000 Unit(s) SubCutaneous every 8 hours  pantoprazole    Tablet 40 milliGRAM(s) Oral before breakfast  sodium chloride 0.9% lock flush 3 milliLiter(s) IV Push every 8 hours    MEDICATIONS  (PRN):  meclizine 25 milliGRAM(s) Oral every 6 hours PRN Dizziness    Allergies    Kiwi (Unknown)  No Known Drug Allergies  peanuts (Unknown)    Intolerances    Vital Signs Last 24 Hrs  T(C): 36.4 (01 May 2022 10:00), Max: 37 (01 May 2022 06:50)  T(F): 97.5 (01 May 2022 10:00), Max: 98.6 (01 May 2022 06:50)  HR: 67 (01 May 2022 06:50) (62 - 70)  BP: 114/67 (01 May 2022 06:50) (114/67 - 126/64)  BP(mean): --  RR: 16 (01 May 2022 06:50) (16 - 18)  SpO2: 97% (01 May 2022 06:50) (97% - 98%)    Physical exam:  Constitutional: No acute distress, conversant  Eyes: Anicteric sclerae, moist conjunctivae, see below for CNs  Neck: trachea midline, FROM, supple, no thyromegaly or lymphadenopathy  Cardiovascular: Regular rate and rhythm, no murmurs, rubs, or gallops. No carotid bruits.   Pulmonary: Anterior breath sounds clear bilaterally, no crackles or wheezing. No use of accessory muscles  GI: Abdomen soft, non-distended, non-tender  Extremities: Radial and DP pulses +2, no edema    Neurologic:  -Mental status: Awake, alert, oriented to person, place, and time. Speech is fluent with intact naming, repetition, and comprehension, no dysarthria, + stuttering speech when speaking longer sentences at times. Recent and remote memory intact. Follows commands. Attention/concentration intact. Fund of knowledge appropriate.    -Cranial nerves:   II: Visual fields are full to confrontation.  III, IV, VI: Extraocular movements are intact without nystagmus. Pupils equally round and reactive to light  V:  Facial sensation V1-V3 equal and intact   VII: Face is symmetric with normal eye closure and smile  VIII: Hearing is bilaterally intact to finger rub  XII: Tongue protrudes midline  Motor: Normal bulk and tone. No pronator drift. Strength bilateral upper extremity 5/5, bilateral lower extremities 5/5.  + Dysdiadochokinesia - Left UE satellites around right UE, Left UE slower on rapid forearm rotation, slower on thumb opposition with left UE  Sensation: Intact to light touch bilaterally. No neglect or extinction on double simultaneous testing.  Coordination: + dysmetria on left finger to nose - zigzags to meet finger. Sublet dysmetria on left LE on heel to shin.   Reflexes: Downgoing toes bilaterally   Gait: initially a narrow based gait and gradually becomes wider gait as patient ambulates, ataxic on tandem gait.     NIHSS: 2     Fingerstick Blood Glucose: CAPILLARY BLOOD GLUCOSE        LABS:                        13.2   5.37  )-----------( 165      ( 01 May 2022 07:08 )             39.3     05-01    140  |  103  |  15  ----------------------------<  93  4.0   |  29  |  1.15    Ca    9.4      01 May 2022 07:08    TPro  6.6  /  Alb  3.7  /  TBili  0.3  /  DBili  x   /  AST  59<H>  /  ALT  103<H>  /  AlkPhos  66  05-    PT/INR - ( 2022 18:10 )   PT: 13.3 sec;   INR: 1.12          PTT - ( 2022 18:10 )  PTT:27.3 sec      Urinalysis Basic - ( 2022 20:24 )    Color: Yellow / Appearance: SL Cloudy / S.020 / pH: x  Gluc: x / Ketone: NEGATIVE  / Bili: Negative / Urobili: 0.2 E.U./dL   Blood: x / Protein: NEGATIVE mg/dL / Nitrite: NEGATIVE   Leuk Esterase: NEGATIVE / RBC: x / WBC x   Sq Epi: x / Non Sq Epi: x / Bacteria: x      RADIOLOGY & ADDITIONAL STUDIES:    **STROKE CONSULT NOTE**    HPI: 31y Male, , with no PMHx presented to [facility] on  after experiencing an episode of acute R ear tinnitus, dizziness, and vomiting when awaking from sleep. States he woke up with ringing in his right ear that got progressively louder, rolled over towards his wife, never rolled back over which was odd to his wife. Pt then had sudden episode of room-spinning sensation, was unable to sit up and get out of bed. Pt had 1 episode of emesis which prompted his wife to call EMS, was brought to [facility] ED. Patient underwent CTH in ED which was negative, CTA H/N showed acute left SCA infarct, was admitted to ICU for close monitoring. MRI on  confirmed acute to subacute stroke in left cerebellum. Pt was started on aspirin 81mg, atorvastatin 40mg, and meclizine. JAROD with bubble performed which showed + PFO , EF 60-65%, no thrombus. Pt stepped down to telemetry floor. Course c/b transaminitis after starting atorvastatin. Pt admits to taking multiple OTC supplements including creatine, pre workout, protein powder, and branched chain amino acids. LFTs downtrended after stopping atorvastatin. Given +PFO with CVA, pt transferred to [facility] for further evaluation for possible intervention with CTSx. Stroke consulted for further management.     Patient states he no longer has room spinning sensation but still feels lightheaded at times. Also states he had headaches until  but have since resolved. Pt notes that he sometimes has difficulties getting longer words out and takes extra effort. Endorses that his left side feels "off," at times has difficulty reaching for objects with his left arm and has difficulty walking as well. Pt denies any current headaches, visual disturbances, slurred speech, facial asymmetry, chest pain, sob, palpitations, abd pain, n/v/d, extremity weakness/numbness.     T(C): 36.4 (22 @ 10:00), Max: 37 (22 @ 06:50)  HR: 67 (22 @ 06:50) (62 - 70)  BP: 114/67 (22 @ 06:50) (114/67 - 126/64)  RR: 16 (22 @ 06:50) (16 - 18)  SpO2: 97% (22 @ 06:50) (97% - 98%)    PAST MEDICAL & SURGICAL HISTORY:  No pertinent past medical history    No significant past surgical history    FAMILY HISTORY:  No pertinent family history in first degree relatives    SOCIAL HISTORY:   Patient lives with his wife and  son  Smoking status: denies tobacco smoking  Drinkin drink x 2 months  Drug Use: marijuana smoking a few times a month, otherwise denies    ROS:   Constitutional: No fever, weight loss or fatigue  Eyes: No eye pain, visual disturbances, or discharge  ENMT:  No difficulty hearing, tinnitus; No sinus or throat pain  Neck: No pain or stiffness  Respiratory: No cough, wheezing, chills or hemoptysis  Cardiovascular: No chest pain, palpitations, shortness of breath, or leg swelling  Gastrointestinal: No abdominal pain. No nausea, vomiting or hematemesis; No diarrhea or constipation.   Genitourinary: No dysuria, frequency, hematuria or incontinence  Neurological: As per HPI    MEDICATIONS  (STANDING):  aspirin enteric coated 81 milliGRAM(s) Oral daily  heparin   Injectable 5000 Unit(s) SubCutaneous every 8 hours  pantoprazole    Tablet 40 milliGRAM(s) Oral before breakfast  sodium chloride 0.9% lock flush 3 milliLiter(s) IV Push every 8 hours    MEDICATIONS  (PRN):  meclizine 25 milliGRAM(s) Oral every 6 hours PRN Dizziness    Allergies    Kiwi (Unknown)  No Known Drug Allergies  peanuts (Unknown)    Intolerances    Vital Signs Last 24 Hrs  T(C): 36.4 (01 May 2022 10:00), Max: 37 (01 May 2022 06:50)  T(F): 97.5 (01 May 2022 10:00), Max: 98.6 (01 May 2022 06:50)  HR: 67 (01 May 2022 06:50) (62 - 70)  BP: 114/67 (01 May 2022 06:50) (114/67 - 126/64)  BP(mean): --  RR: 16 (01 May 2022 06:50) (16 - 18)  SpO2: 97% (01 May 2022 06:50) (97% - 98%)    Physical exam:  Constitutional: No acute distress, conversant  Eyes: Anicteric sclerae, moist conjunctivae, see below for CNs  Neck: trachea midline, FROM, supple, no thyromegaly or lymphadenopathy  Cardiovascular: Regular rate and rhythm, no murmurs, rubs, or gallops. No carotid bruits.   Pulmonary: Anterior breath sounds clear bilaterally, no crackles or wheezing. No use of accessory muscles  GI: Abdomen soft, non-distended, non-tender  Extremities: Radial and DP pulses +2, no edema    Neurologic:  -Mental status: Awake, alert, oriented to person, place, and time. Speech is fluent with intact naming, repetition, and comprehension, no dysarthria, + stuttering speech when speaking longer sentences at times. Recent and remote memory intact. Follows commands. Attention/concentration intact. Fund of knowledge appropriate.    -Cranial nerves:   II: Visual fields are full to confrontation.  III, IV, VI: Extraocular movements are intact without nystagmus. Pupils equally round and reactive to light  V:  Facial sensation V1-V3 equal and intact   VII: Face is symmetric with normal eye closure and smile  VIII: Hearing is bilaterally intact to finger rub  XII: Tongue protrudes midline  Motor: Normal bulk and tone. No pronator drift. Strength bilateral upper extremity 5/5, bilateral lower extremities 5/5.  + Dysdiadochokinesia - Left UE satellites around right UE, Left UE slower on rapid forearm rotation, slower on thumb opposition with left UE  Sensation: Intact to light touch bilaterally. No neglect or extinction on double simultaneous testing.  Coordination: + dysmetria on left finger to nose - zigzags to meet finger. Subtle dysmetria on left LE on heel to shin.   Reflexes: Downgoing toes bilaterally   Gait: initially a narrow based gait and gradually becomes wider gait as patient ambulates, ataxic on tandem gait.     NIHSS: 2     Fingerstick Blood Glucose: CAPILLARY BLOOD GLUCOSE        LABS:                        13.2   5.37  )-----------( 165      ( 01 May 2022 07:08 )             39.3     05-01    140  |  103  |  15  ----------------------------<  93  4.0   |  29  |  1.15    Ca    9.4      01 May 2022 07:08    TPro  6.6  /  Alb  3.7  /  TBili  0.3  /  DBili  x   /  AST  59<H>  /  ALT  103<H>  /  AlkPhos  66  05-    PT/INR - ( 2022 18:10 )   PT: 13.3 sec;   INR: 1.12          PTT - ( 2022 18:10 )  PTT:27.3 sec      Urinalysis Basic - ( 2022 20:24 )    Color: Yellow / Appearance: SL Cloudy / S.020 / pH: x  Gluc: x / Ketone: NEGATIVE  / Bili: Negative / Urobili: 0.2 E.U./dL   Blood: x / Protein: NEGATIVE mg/dL / Nitrite: NEGATIVE   Leuk Esterase: NEGATIVE / RBC: x / WBC x   Sq Epi: x / Non Sq Epi: x / Bacteria: x      RADIOLOGY & ADDITIONAL STUDIES:    **STROKE CONSULT NOTE**    HPI: 31y Male, , with no PMHx presented to [facility] on  after experiencing an episode of acute R ear tinnitus, dizziness, and vomiting when awaking from sleep. States he woke up with ringing in his right ear that got progressively louder, rolled over towards his wife, never rolled back over which was odd to his wife. Pt then had sudden episode of room-spinning sensation, was unable to sit up and get out of bed. Pt had 1 episode of emesis which prompted his wife to call EMS, was brought to [facility] ED. Patient underwent CTH in ED which was negative, CTA H/N showed acute left SCA infarct, was admitted to ICU for close monitoring. MRI on  confirmed acute to subacute stroke in left cerebellum. Pt was started on aspirin 81mg, atorvastatin 40mg, and meclizine. JAROD with bubble performed which showed + PFO , EF 60-65%, no thrombus. Pt stepped down to telemetry floor. Course c/b transaminitis after starting atorvastatin. Pt admits to taking multiple OTC supplements including creatine, pre workout, protein powder, and branched chain amino acids. LFTs downtrended after stopping atorvastatin. Given +PFO with CVA, pt transferred to [facility] for further evaluation for possible intervention with CTSx. Stroke consulted for further management.     Patient states he no longer has room spinning sensation but still feels lightheaded at times. Also states he had headaches until  but have since resolved. Pt notes that he sometimes has difficulties getting longer words out and takes extra effort. Endorses that his left side feels "off," at times has difficulty reaching for objects with his left arm and has difficulty walking as well. Pt denies any current headaches, visual disturbances, slurred speech, facial asymmetry, chest pain, sob, palpitations, abd pain, n/v/d, extremity weakness/numbness.     Of note, pt endorsed having an episode similar to this in . States he woke up in the morning and had sudden room-spinning sensation, x1 episode of emesis, and stated it was difficult to walk. However, the episode was not as severe as this episode he experienced on .     T(C): 36.4 (22 @ 10:00), Max: 37 (22 @ 06:50)  HR: 67 (22 @ 06:50) (62 - 70)  BP: 114/67 (22 @ 06:50) (114/67 - 126/64)  RR: 16 (22 @ 06:50) (16 - 18)  SpO2: 97% (22 @ 06:50) (97% - 98%)    PAST MEDICAL & SURGICAL HISTORY:  No pertinent past medical history    No significant past surgical history    FAMILY HISTORY:  No pertinent family history in first degree relatives    SOCIAL HISTORY:   Patient lives with his wife and  son  Smoking status: denies tobacco smoking  Drinkin drink x 2 months  Drug Use: marijuana smoking a few times a month, otherwise denies    ROS:   Constitutional: No fever, weight loss or fatigue  Eyes: No eye pain, visual disturbances, or discharge  ENMT:  No difficulty hearing, tinnitus; No sinus or throat pain  Neck: No pain or stiffness  Respiratory: No cough, wheezing, chills or hemoptysis  Cardiovascular: No chest pain, palpitations, shortness of breath, or leg swelling  Gastrointestinal: No abdominal pain. No nausea, vomiting or hematemesis; No diarrhea or constipation.   Genitourinary: No dysuria, frequency, hematuria or incontinence  Neurological: As per HPI    MEDICATIONS  (STANDING):  aspirin enteric coated 81 milliGRAM(s) Oral daily  heparin   Injectable 5000 Unit(s) SubCutaneous every 8 hours  pantoprazole    Tablet 40 milliGRAM(s) Oral before breakfast  sodium chloride 0.9% lock flush 3 milliLiter(s) IV Push every 8 hours    MEDICATIONS  (PRN):  meclizine 25 milliGRAM(s) Oral every 6 hours PRN Dizziness    Allergies    Kiwi (Unknown)  No Known Drug Allergies  peanuts (Unknown)    Intolerances    Vital Signs Last 24 Hrs  T(C): 36.4 (01 May 2022 10:00), Max: 37 (01 May 2022 06:50)  T(F): 97.5 (01 May 2022 10:00), Max: 98.6 (01 May 2022 06:50)  HR: 67 (01 May 2022 06:50) (62 - 70)  BP: 114/67 (01 May 2022 06:50) (114/67 - 126/64)  BP(mean): --  RR: 16 (01 May 2022 06:50) (16 - 18)  SpO2: 97% (01 May 2022 06:50) (97% - 98%)    Physical exam:  Constitutional: No acute distress, conversant  Eyes: Anicteric sclerae, moist conjunctivae, see below for CNs  Neck: trachea midline, FROM, supple, no thyromegaly or lymphadenopathy  Cardiovascular: Regular rate and rhythm, no murmurs, rubs, or gallops. No carotid bruits.   Pulmonary: Anterior breath sounds clear bilaterally, no crackles or wheezing. No use of accessory muscles  GI: Abdomen soft, non-distended, non-tender  Extremities: Radial and DP pulses +2, no edema    Neurologic:  -Mental status: Awake, alert, oriented to person, place, and time. Speech is fluent with intact naming, repetition, and comprehension, no dysarthria, + stuttering speech when speaking longer sentences at times. Recent and remote memory intact. Follows commands. Attention/concentration intact. Fund of knowledge appropriate.    -Cranial nerves:   II: Visual fields are full to confrontation.  III, IV, VI: Extraocular movements are intact without nystagmus. Pupils equally round and reactive to light  V:  Facial sensation V1-V3 equal and intact   VII: Face is symmetric with normal eye closure and smile  VIII: Hearing is bilaterally intact to finger rub  XII: Tongue protrudes midline  Motor: Normal bulk and tone. No pronator drift. Strength bilateral upper extremity 5/5, bilateral lower extremities 5/5.  + Dysdiadochokinesia - Left UE satellites around right UE, Left UE slower on rapid forearm rotation, slower on thumb opposition with left UE  Sensation: Intact to light touch bilaterally. No neglect or extinction on double simultaneous testing.  Coordination: + dysmetria on left finger to nose - zigzags to meet finger. Subtle dysmetria on left LE on heel to shin.   Reflexes: Downgoing toes bilaterally   Gait: initially a narrow based gait and gradually becomes wider gait as patient ambulates, ataxic on tandem gait.     NIHSS: 2     Fingerstick Blood Glucose: CAPILLARY BLOOD GLUCOSE        LABS:                        13.2   5.37  )-----------( 165      ( 01 May 2022 07:08 )             39.3     05-01    140  |  103  |  15  ----------------------------<  93  4.0   |  29  |  1.15    Ca    9.4      01 May 2022 07:08    TPro  6.6  /  Alb  3.7  /  TBili  0.3  /  DBili  x   /  AST  59<H>  /  ALT  103<H>  /  AlkPhos  66  05-01    PT/INR - ( 2022 18:10 )   PT: 13.3 sec;   INR: 1.12          PTT - ( 2022 18:10 )  PTT:27.3 sec      Urinalysis Basic - ( 2022 20:24 )    Color: Yellow / Appearance: SL Cloudy / S.020 / pH: x  Gluc: x / Ketone: NEGATIVE  / Bili: Negative / Urobili: 0.2 E.U./dL   Blood: x / Protein: NEGATIVE mg/dL / Nitrite: NEGATIVE   Leuk Esterase: NEGATIVE / RBC: x / WBC x   Sq Epi: x / Non Sq Epi: x / Bacteria: x      RADIOLOGY & ADDITIONAL STUDIES:

## 2022-05-01 NOTE — CONSULT NOTE ADULT - ASSESSMENT
30 y/o  , taking multiple OTC including amino acids, admitted for CVSA...found to have large PFO...Heme consult to r/o Hypercoagulable state...

## 2022-05-02 LAB
24R-OH-CALCIDIOL SERPL-MCNC: 25 NG/ML — LOW (ref 30–80)
A1C WITH ESTIMATED AVERAGE GLUCOSE RESULT: 5.3 % — SIGNIFICANT CHANGE UP (ref 4–5.6)
ALBUMIN SERPL ELPH-MCNC: 3.6 G/DL — SIGNIFICANT CHANGE UP (ref 3.3–5)
ALP SERPL-CCNC: 58 U/L — SIGNIFICANT CHANGE UP (ref 40–120)
ALT FLD-CCNC: 104 U/L — HIGH (ref 10–45)
ANION GAP SERPL CALC-SCNC: 8 MMOL/L — SIGNIFICANT CHANGE UP (ref 5–17)
APTT BLD: 23.8 SEC — LOW (ref 27.5–35.5)
AST SERPL-CCNC: 57 U/L — HIGH (ref 10–40)
BILIRUB DIRECT SERPL-MCNC: <0.2 MG/DL — SIGNIFICANT CHANGE UP (ref 0–0.3)
BILIRUB INDIRECT FLD-MCNC: SIGNIFICANT CHANGE UP MG/DL (ref 0.2–1)
BILIRUB SERPL-MCNC: 0.2 MG/DL — SIGNIFICANT CHANGE UP (ref 0.2–1.2)
BUN SERPL-MCNC: 17 MG/DL — SIGNIFICANT CHANGE UP (ref 7–23)
CALCIUM SERPL-MCNC: 9.3 MG/DL — SIGNIFICANT CHANGE UP (ref 8.4–10.5)
CHLORIDE SERPL-SCNC: 104 MMOL/L — SIGNIFICANT CHANGE UP (ref 96–108)
CO2 SERPL-SCNC: 27 MMOL/L — SIGNIFICANT CHANGE UP (ref 22–31)
CONFIRM APTT STACLOT: NEGATIVE — SIGNIFICANT CHANGE UP
CREAT SERPL-MCNC: 1.16 MG/DL — SIGNIFICANT CHANGE UP (ref 0.5–1.3)
DRVVT SCREEN TO CONFIRM RATIO: SIGNIFICANT CHANGE UP
EGFR: 86 ML/MIN/1.73M2 — SIGNIFICANT CHANGE UP
ESTIMATED AVERAGE GLUCOSE: 105 MG/DL — SIGNIFICANT CHANGE UP (ref 68–114)
GLUCOSE SERPL-MCNC: 93 MG/DL — SIGNIFICANT CHANGE UP (ref 70–99)
HCT VFR BLD CALC: 37.9 % — LOW (ref 39–50)
HCYS SERPL-MCNC: 6 UMOL/L — SIGNIFICANT CHANGE UP
HGB BLD-MCNC: 12.3 G/DL — LOW (ref 13–17)
INR BLD: 1.1 — SIGNIFICANT CHANGE UP (ref 0.88–1.16)
LA NT DPL PPP QL: 29 SEC — SIGNIFICANT CHANGE UP
MAGNESIUM SERPL-MCNC: 1.9 MG/DL — SIGNIFICANT CHANGE UP (ref 1.6–2.6)
MCHC RBC-ENTMCNC: 27.2 PG — SIGNIFICANT CHANGE UP (ref 27–34)
MCHC RBC-ENTMCNC: 32.5 GM/DL — SIGNIFICANT CHANGE UP (ref 32–36)
MCV RBC AUTO: 83.7 FL — SIGNIFICANT CHANGE UP (ref 80–100)
NRBC # BLD: 0 /100 WBCS — SIGNIFICANT CHANGE UP (ref 0–0)
PLATELET # BLD AUTO: 158 K/UL — SIGNIFICANT CHANGE UP (ref 150–400)
POTASSIUM SERPL-MCNC: 3.9 MMOL/L — SIGNIFICANT CHANGE UP (ref 3.5–5.3)
POTASSIUM SERPL-SCNC: 3.9 MMOL/L — SIGNIFICANT CHANGE UP (ref 3.5–5.3)
PROT SERPL-MCNC: 6.5 G/DL — SIGNIFICANT CHANGE UP (ref 6–8.3)
PROTHROM AB SERPL-ACNC: 13.1 SEC — SIGNIFICANT CHANGE UP (ref 10.5–13.4)
RBC # BLD: 4.53 M/UL — SIGNIFICANT CHANGE UP (ref 4.2–5.8)
RBC # FLD: 13.2 % — SIGNIFICANT CHANGE UP (ref 10.3–14.5)
SODIUM SERPL-SCNC: 139 MMOL/L — SIGNIFICANT CHANGE UP (ref 135–145)
TSH SERPL-MCNC: 3.89 UIU/ML — SIGNIFICANT CHANGE UP (ref 0.27–4.2)
VIT B12 SERPL-MCNC: 1482 PG/ML — HIGH (ref 232–1245)
WBC # BLD: 4.57 K/UL — SIGNIFICANT CHANGE UP (ref 3.8–10.5)
WBC # FLD AUTO: 4.57 K/UL — SIGNIFICANT CHANGE UP (ref 3.8–10.5)

## 2022-05-02 PROCEDURE — 99232 SBSQ HOSP IP/OBS MODERATE 35: CPT

## 2022-05-02 PROCEDURE — 93970 EXTREMITY STUDY: CPT | Mod: 26

## 2022-05-02 PROCEDURE — 93880 EXTRACRANIAL BILAT STUDY: CPT | Mod: 26

## 2022-05-02 RX ORDER — CLOPIDOGREL BISULFATE 75 MG/1
75 TABLET, FILM COATED ORAL DAILY
Refills: 0 | Status: DISCONTINUED | OUTPATIENT
Start: 2022-05-02 | End: 2022-05-03

## 2022-05-02 RX ADMIN — HEPARIN SODIUM 5000 UNIT(S): 5000 INJECTION INTRAVENOUS; SUBCUTANEOUS at 14:24

## 2022-05-02 RX ADMIN — SODIUM CHLORIDE 3 MILLILITER(S): 9 INJECTION INTRAMUSCULAR; INTRAVENOUS; SUBCUTANEOUS at 14:21

## 2022-05-02 RX ADMIN — CLOPIDOGREL BISULFATE 75 MILLIGRAM(S): 75 TABLET, FILM COATED ORAL at 11:24

## 2022-05-02 RX ADMIN — SODIUM CHLORIDE 3 MILLILITER(S): 9 INJECTION INTRAMUSCULAR; INTRAVENOUS; SUBCUTANEOUS at 06:00

## 2022-05-02 RX ADMIN — PANTOPRAZOLE SODIUM 40 MILLIGRAM(S): 20 TABLET, DELAYED RELEASE ORAL at 05:59

## 2022-05-02 RX ADMIN — Medication 81 MILLIGRAM(S): at 11:24

## 2022-05-02 RX ADMIN — HEPARIN SODIUM 5000 UNIT(S): 5000 INJECTION INTRAVENOUS; SUBCUTANEOUS at 05:59

## 2022-05-02 RX ADMIN — SODIUM CHLORIDE 3 MILLILITER(S): 9 INJECTION INTRAMUSCULAR; INTRAVENOUS; SUBCUTANEOUS at 21:47

## 2022-05-02 RX ADMIN — HEPARIN SODIUM 5000 UNIT(S): 5000 INJECTION INTRAVENOUS; SUBCUTANEOUS at 22:01

## 2022-05-02 NOTE — PROGRESS NOTE ADULT - ASSESSMENT
30 y/o M, , with no significant PMHx who presented to [facility] 7 days ago after experiencing an episode of tinnitus, dizziness, and vomiting when awaking from sleep. Pt wife called EMS who brought patient to [facility]. While there, patient underwent CT head in ED which was negative. However, subsequent CTA H/N revealed acute infarct in left superior cerebellar artery territory. Pt was admitted to ICU for closer monitoring.  MRI confirmed acute infarct involving the left superior cerebellar artery territory. Pt also treated for vertigo symptoms. Further w/u revealed +PFO confirmed by ECHO with bubble, EF 60-65%. JAROD positive for PFO, no thrombus. Of note, pt developed transaminitis after starting atorvastatin for CVA ppx. Pt admits to taking multiple OTC supplements including amino acids, protein, and creatinine. LFTs downtrended after stopping atorvastatin. Given +PFO with CVA, pt transferred to [facility] for further evaluation for possible intervention. Stroke team and hematology teams were consulted for further workup of CVA. Pending echo, afterwards plan to go to acute rehab with OT while pending CVA workup.    Plan:    Neurovascular:   -Pain well controlled with current regimen. PRN's:    Cardiovascular:   -Hemodynamically stable.   -Monitor: BP, HR, tele    Respiratory:   -Oxygenating well on room air  -Encourage continued use of IS 10x/hr and frequent ambulation  -CXR:    GI:  -GI PPX:  -PO Diet  -Bowel Regimen:     Renal / :  -Continue to monitor renal function: BUN/Cr  -Monitor I/O's daily     Endocrine:    -No hx of DM or thyroid dx  -A1c:  -TSH:    Hematologic:  -CBC: H/H-  -Coagulation Panel.    ID:  -Temperature:   -CBC: WBC-  -Continue to observe for SIRS/Sepsis Syndrome.    Prophylaxis:  -DVT prophylaxis with 5000 SubQ Heparin q8h.  -Continue with SCD's b/l while patient is at rest     Disposition:  -Discharge to rehab once patient is medically ready         30 y/o M, , with no significant PMHx who presented to [facility] 7 days ago after experiencing an episode of tinnitus, dizziness, and vomiting when awaking from sleep. Pt wife called EMS who brought patient to [facility]. While there, patient underwent CT head in ED which was negative. However, subsequent CTA H/N revealed acute infarct in left superior cerebellar artery territory. Pt was admitted to ICU for closer monitoring.  MRI confirmed acute infarct involving the left superior cerebellar artery territory. Pt also treated for vertigo symptoms. Further w/u revealed +PFO confirmed by ECHO with bubble, EF 60-65%. JAROD positive for PFO, no thrombus. Of note, pt developed transaminitis after starting atorvastatin for CVA ppx. Pt admits to taking multiple OTC supplements including amino acids, protein, and creatinine. LFTs downtrended after stopping atorvastatin. Given +PFO with CVA, pt transferred to [facility] for further evaluation for possible intervention. Stroke team and hematology teams were consulted for further workup of CVA. Pending echo, afterwards plan to go to acute rehab with [facility] while pending CVA workup.    Plan:    Neurovascular:   -Pain well controlled with current regimen.   CVA  -stroke team following  -continue plavix  -holding statin at this time 2/2 LFT's   -continue meclizine for vertigo    Cardiovascular:   PFO on JAROD  -will leave with [facility]  -continue ASA/Plavix  -Hemodynamically stable.   -Monitor: BP, HR, tele    Respiratory:   -Oxygenating well on room air  -Encourage continued use of IS 10x/hr and frequent ambulation    GI:  -GI PPX: continue protonix  -PO Diet  -Bowel Regimen: not required at this time    Renal / :  -Continue to monitor renal function: BUN/Cr: 17/1.16  -Monitor I/O's daily     Endocrine:    -No hx of DM or thyroid dx  -A1c: 5.3  -TSH: 3.890    Hematologic:  -CBC: H/H- 12.3/37.9  -Coagulation Panel in AM  -no overt signs of symptoms of bleeding  hypercoagulable workup  -hematology following  -pending labs    ID:  -Temperature: afebrile  -CBC: WBC- 4.57  -Continue to observe for SIRS/Sepsis Syndrome.    Prophylaxis:  -DVT prophylaxis with 5000 SubQ Heparin q8h.  -Continue with SCD's b/l while patient is at rest     Disposition:  -Discharge to rehab once patient is medically ready

## 2022-05-02 NOTE — PROGRESS NOTE ADULT - ASSESSMENT
31y Male, , with no PMHx presented to [facility] on 4/23 after experiencing an episode of acute R ear tinnitus, dizziness, and vomiting when awaking from sleep, found to have acute to subacute left SCA infarct on CTA h/n and MRI with residual left sided dysmetria and ataxia, c/b transaminitis after starting statin, transferred to [facility] under CTSx for +PFO found on JAROD. Stroke consulted for further management.     Recommend:   1)Secondary stroke prevention  - continue ASA 81mg PO daily and start Plavix 75mg PO daily if not planned for PFO repair this admission  - start Atorvastatin 80mg PO daily if LFTs are < x3 the upper limit as pt is still within subacute stroke timeline. can recheck LFTs 2 days after initiating treatment to monitor LFTs.     2) Stroke risk factors  - A1C: pending  - LDL: please obtain  - TSH: 3.049 @[facility]    3) Further management  - please obtain [facility] imaging  - [facility] MRI brain without contrast 4/24: acute to subacute infarct in left cerebellum   - recommend SBP goal <160  - recommend q4hr stroke neuro checks  - JAROD @ [facility]: EF 60-65%, +PFO, no thrombus   - recommend ILR placement, LE dopplers  - please obtain hypercoagulable panel - of note per [facility] chart, protein c/s and antithrombin III negative.   - may need outpt neurology follow up  - may require outpt heart monitor  - provide stroke education    DVT prophylaxis   -Lovenox SQ and SCDs    Discussed with Neurology Attending Dr. Blanco

## 2022-05-02 NOTE — PHYSICAL THERAPY INITIAL EVALUATION ADULT - PERTINENT HX OF CURRENT PROBLEM, REHAB EVAL
Patient is a 31 year old male 31y Male, , with no PMHx presented to [facility] on 4/23 after experiencing an episode of acute R ear tinnitus, dizziness, and vomiting when awaking from sleep. Patient underwent CTH in ED which was negative, CTA H/N showed acute left SCA infarct, was admitted to ICU for close monitoring. MRI on 4/24 confirmed acute to subacute stroke in left cerebellum. Further w/u revealed +PFO confirmed by ECHO with bubble.

## 2022-05-02 NOTE — PROGRESS NOTE ADULT - SUBJECTIVE AND OBJECTIVE BOX
HEALTH ISSUES - PROBLEM Dx:  Cerebrovascular accident (CVA)    PFO (patent foramen ovale)            CHEMOTHERAPY REGIMEN:        Day:                          Diet:  Protocol:                                    IVF:      MEDICATIONS  (STANDING):  aspirin enteric coated 81 milliGRAM(s) Oral daily  clopidogrel Tablet 75 milliGRAM(s) Oral daily  heparin   Injectable 5000 Unit(s) SubCutaneous every 8 hours  pantoprazole    Tablet 40 milliGRAM(s) Oral before breakfast  sodium chloride 0.9% lock flush 3 milliLiter(s) IV Push every 8 hours    MEDICATIONS  (PRN):  meclizine 25 milliGRAM(s) Oral every 6 hours PRN Dizziness      Allergies    Kiwi (Unknown)  No Known Drug Allergies  peanuts (Unknown)    Intolerances        DVT Prophylaxis: [ ] YES [ ] NO      Antibiotics: [ ] YES [ ] NO    Pain Scale (1-10):       Location:    Vital Signs Last 24 Hrs  T(C): 36.6 (02 May 2022 14:14), Max: 37.1 (01 May 2022 18:16)  T(F): 97.9 (02 May 2022 14:14), Max: 98.7 (01 May 2022 18:16)  HR: 83 (02 May 2022 11:26) (58 - 83)  BP: 131/60 (02 May 2022 11:26) (115/59 - 131/60)  BP(mean): --  RR: 16 (02 May 2022 11:26) (16 - 18)  SpO2: 98% (02 May 2022 11:26) (97% - 98%)    Drug Dosing Weight  Height (cm): 185.4 (01 May 2022 09:00)  Weight (kg): 93.1 (01 May 2022 09:00)  BMI (kg/m2): 27.1 (01 May 2022 09:00)  BSA (m2): 2.18 (01 May 2022 09:00)     Physical Exam:  · Constitutional	detailed exam  · Constitutional Details	well-developed; well-groomed  · Eyes	EOMI; PERRL; no drainage or redness  · ENMT Comments	dry mucous membranes  · Respiratory	detailed exam  · Respiratory Comments	normal breath sounds at the lung bases bilaterally  · Cardiovascular	Regular rate & rhythm, normal S1, S2; no murmurs, gallops or rubs; no S3, S4  · Abd-Soft non tender  ·Ext-no edema, clubbing or cyanosis    URINARY CATHETER: [ ] YES [ ] NO     LABS:  CBC Full  -  ( 02 May 2022 05:43 )  WBC Count : 4.57 K/uL  RBC Count : 4.53 M/uL  Hemoglobin : 12.3 g/dL  Hematocrit : 37.9 %  Platelet Count - Automated : 158 K/uL  Mean Cell Volume : 83.7 fl  Mean Cell Hemoglobin : 27.2 pg  Mean Cell Hemoglobin Concentration : 32.5 gm/dL  Auto Neutrophil # : x  Auto Lymphocyte # : x  Auto Monocyte # : x  Auto Eosinophil # : x  Auto Basophil # : x  Auto Neutrophil % : x  Auto Lymphocyte % : x  Auto Monocyte % : x  Auto Eosinophil % : x  Auto Basophil % : x        139  |  104  |  17  ----------------------------<  93  3.9   |  27  |  1.16    Ca    9.3      02 May 2022 05:43  Mg     1.9     05-    TPro  6.5  /  Alb  3.6  /  TBili  0.2  /  DBili  <0.2  /  AST  57<H>  /  ALT  104<H>  /  AlkPhos  58  05-02    PT/INR - ( 02 May 2022 05:43 )   PT: 13.1 sec;   INR: 1.10          PTT - ( 02 May 2022 05:43 )  PTT:23.8 sec  Urinalysis Basic - ( 2022 20:24 )    Color: Yellow / Appearance: SL Cloudy / S.020 / pH: x  Gluc: x / Ketone: NEGATIVE  / Bili: Negative / Urobili: 0.2 E.U./dL   Blood: x / Protein: NEGATIVE mg/dL / Nitrite: NEGATIVE   Leuk Esterase: NEGATIVE / RBC: x / WBC x   Sq Epi: x / Non Sq Epi: x / Bacteria: x        CULTURES:    RADIOLOGY & ADDITIONAL STUDIES:

## 2022-05-02 NOTE — PROGRESS NOTE ADULT - ASSESSMENT
31y Male, , with no PMHx presented to [facility] on 4/23 after experiencing an episode of acute R ear tinnitus, dizziness, and vomiting when awaking from sleep, found to have acute to subacute left SCA infarct on CTA h/n and MRI with residual left sided dysmetria and ataxia, c/b transaminitis after starting statin, transferred to [facility] under CTSx for +PFO found on JAROD. Vascular Surgery consulted for further management.     Plan:   f/u Bilateral Carotid US (pending)  f/u Bilateral LE Venous Duplex US (pending)  No acute vascular intervention at this time  Vascular will continue to follow 31y Male, , with no PMHx presented to [facility] on 4/23 after experiencing an episode of acute R ear tinnitus, dizziness, and vomiting when awaking from sleep, found to have acute to subacute left SCA infarct on CTA h/n and MRI with residual left sided dysmetria and ataxia, c/b transaminitis after starting statin, transferred to [facility] under CTSx for +PFO found on JAROD. Vascular Surgery consulted for further management.     Plan:   f/u Bilateral Carotid US (pending)  f/u Bilateral LE Venous Duplex US (pending)  No acute vascular intervention at this time  Vascular will continue to follow    Addendum:  No significant carotid disease, no LE DVTs. No acute vascular intervention at this time, signing off. Call [phone] with questions.

## 2022-05-02 NOTE — PHYSICAL THERAPY INITIAL EVALUATION ADULT - PLANNED THERAPY INTERVENTIONS, PT EVAL
balance training/bed mobility training/gait training/motor coordination training/neuromuscular re-education/transfer training

## 2022-05-02 NOTE — PROGRESS NOTE ADULT - ASSESSMENT
young pt , NO significant PMH, admitted with stroke...found to have PFO...takes multiple OTC supplements...

## 2022-05-02 NOTE — PROGRESS NOTE ADULT - SUBJECTIVE AND OBJECTIVE BOX
Subjective: no complaints. Patient reports that carotid duplex and BLE have not been done yet. No additional questions at this time.     MEDICATIONS  (STANDING):  aspirin enteric coated 81 milliGRAM(s) Oral daily  clopidogrel Tablet 75 milliGRAM(s) Oral daily  heparin   Injectable 5000 Unit(s) SubCutaneous every 8 hours  pantoprazole    Tablet 40 milliGRAM(s) Oral before breakfast  sodium chloride 0.9% lock flush 3 milliLiter(s) IV Push every 8 hours    MEDICATIONS  (PRN):  meclizine 25 milliGRAM(s) Oral every 6 hours PRN Dizziness      Vital Signs Last 24 Hrs  T(C): 36.5 (02 May 2022 06:52), Max: 37.1 (01 May 2022 18:16)  T(F): 97.7 (02 May 2022 06:52), Max: 98.7 (01 May 2022 18:16)  HR: 65 (02 May 2022 08:17) (58 - 75)  BP: 129/68 (02 May 2022 08:17) (115/59 - 133/64)  BP(mean): --  RR: 16 (02 May 2022 08:17) (16 - 18)  SpO2: 98% (02 May 2022 08:17) (97% - 98%)    Physical Exam  General: NAD, resting comfortably in chair  Pulm: Nonlabored breathing, no respiratory distress  Extrem: WWP, no edema  Neuro: A/O x 3, CNs II-XII grossly intact, no focal deficits, normal sensation      I&O's Summary    01 May 2022 07:01  -  02 May 2022 07:00  --------------------------------------------------------  IN: 240 mL / OUT: 600 mL / NET: -360 mL                              12.3   4.57  )-----------( 158      ( 02 May 2022 05:43 )             37.9       05-02    139  |  104  |  17  ----------------------------<  93  3.9   |  27  |  1.16    Ca    9.3      02 May 2022 05:43  Mg     1.9     05-02    TPro  6.5  /  Alb  3.6  /  TBili  0.2  /  DBili  <0.2  /  AST  57<H>  /  ALT  104<H>  /  AlkPhos  58  05-02        Imaging: pending carotid duplex, will follow up

## 2022-05-02 NOTE — PROGRESS NOTE ADULT - NS ATTEND AMEND GEN_ALL_CORE FT
patient was seen on rounds with  ACPs.   Agree with findings and exam. patient with PFO and SCA stroke. Needs zio patch and to have hypercoag work up completed.   Check testosterone level.   Discussed with Dr. Delgado  Plan will be to bring back for PFO closure once above work up done.   continue DAPT.   Crestor 5 mg daily

## 2022-05-02 NOTE — PHYSICAL THERAPY INITIAL EVALUATION ADULT - ADDITIONAL COMMENTS
Patient reports he lives with his wife and  child in an elevator apartment building. PTA patient was a  who was independent with all mobility/ADLs

## 2022-05-02 NOTE — PHYSICAL THERAPY INITIAL EVALUATION ADULT - GENERAL OBSERVATIONS, REHAB EVAL
Spoke with CARMEN Hare who cleared for OOB. Patient received sitting in chair in NAD with +chair alarm +ECG +heplock.

## 2022-05-02 NOTE — PHYSICAL THERAPY INITIAL EVALUATION ADULT - MODALITIES TREATMENT COMMENTS
Cranial Nerves III - XII: III, IV, VI: EOMI, no nystagmus appreciated. Vision H-Test: mild jumping on left noted during tracking; Convergence/Divergence: mild impairment on left during convergence; Vision Quadrant Test: intact V: facial sensation intact to light touch V1-V3 b/l VII: no ptosis, no facial droop, symmetric eyebrow raise and closure VIII: hearing intact to finger rub b/l  XI: head turning and shoulder shrug intact b/l XII: tongue protrusion midline.

## 2022-05-02 NOTE — PROGRESS NOTE ADULT - SUBJECTIVE AND OBJECTIVE BOX
Neurology Stroke Progress Note    INTERVAL HPI/OVERNIGHT EVENTS:  No acute overnight events. Pt seen and examined. Denies new symptoms at this time, c/o intermittent lightheadedness. Denies new weakness/numbness of extremities, headaches, visual disturbances, facial asymmetry.     MEDICATIONS  (STANDING):  aspirin enteric coated 81 milliGRAM(s) Oral daily  clopidogrel Tablet 75 milliGRAM(s) Oral daily  heparin   Injectable 5000 Unit(s) SubCutaneous every 8 hours  pantoprazole    Tablet 40 milliGRAM(s) Oral before breakfast  sodium chloride 0.9% lock flush 3 milliLiter(s) IV Push every 8 hours    MEDICATIONS  (PRN):  meclizine 25 milliGRAM(s) Oral every 6 hours PRN Dizziness      Allergies    Kiwi (Unknown)  No Known Drug Allergies  peanuts (Unknown)    Intolerances    Vital Signs Last 24 Hrs  T(C): 36.6 (02 May 2022 10:17), Max: 37.1 (01 May 2022 18:16)  T(F): 97.9 (02 May 2022 10:17), Max: 98.7 (01 May 2022 18:16)  HR: 83 (02 May 2022 11:26) (58 - 83)  BP: 131/60 (02 May 2022 11:26) (115/59 - 133/64)  BP(mean): --  RR: 16 (02 May 2022 11:26) (16 - 18)  SpO2: 98% (02 May 2022 11:26) (97% - 98%)    Physical exam:  Constitutional: No acute distress, conversant  Eyes: Anicteric sclerae, moist conjunctivae, see below for CNs  Neck: trachea midline, FROM, supple, no thyromegaly or lymphadenopathy  Cardiovascular: Regular rate and rhythm, no murmurs, rubs, or gallops. No carotid bruits.   Pulmonary: Anterior breath sounds clear bilaterally, no crackles or wheezing. No use of accessory muscles  GI: Abdomen soft, non-distended, non-tender  Extremities: Radial and DP pulses +2, no edema    Neurologic:  -Mental status: Awake, alert, oriented to person, place, and time. Speech is fluent with intact naming, repetition, and comprehension, no dysarthria, + stuttering speech when speaking longer sentences at times. Recent and remote memory intact. Follows commands. Attention/concentration intact. Fund of knowledge appropriate.    -Cranial nerves:   II: Visual fields are full to confrontation.  III, IV, VI: Extraocular movements are intact without nystagmus. Pupils equally round and reactive to light  V:  Facial sensation V1-V3 equal and intact   VII: Face is symmetric with normal eye closure and smile  VIII: Hearing is bilaterally intact to finger rub  XII: Tongue protrudes midline  Motor: Normal bulk and tone. No pronator drift. Strength bilateral upper extremity 5/5, bilateral lower extremities 5/5.  + Dysdiadochokinesia - Left UE satellites around right UE, Left UE slower on rapid forearm rotation, slower on thumb opposition with left UE  Sensation: Intact to light touch bilaterally. No neglect or extinction on double simultaneous testing.  Coordination: + dysmetria on left finger to nose - zigzags to meet finger. Subtle dysmetria on left LE on heel to shin.   Reflexes: Downgoing toes bilaterally   Gait: initially a narrow based gait and gradually becomes wider gait as patient ambulates, ataxic on tandem gait.     LABS:                        12.3   4.57  )-----------( 158      ( 02 May 2022 05:43 )             37.9     05-02    139  |  104  |  17  ----------------------------<  93  3.9   |  27  |  1.16    Ca    9.3      02 May 2022 05:43  Mg     1.9     05-02    TPro  6.5  /  Alb  3.6  /  TBili  0.2  /  DBili  <0.2  /  AST  57<H>  /  ALT  104<H>  /  AlkPhos  58  05-02    PT/INR - ( 02 May 2022 05:43 )   PT: 13.1 sec;   INR: 1.10          PTT - ( 02 May 2022 05:43 )  PTT:23.8 sec  Urinalysis Basic - ( 2022 20:24 )    Color: Yellow / Appearance: SL Cloudy / S.020 / pH: x  Gluc: x / Ketone: NEGATIVE  / Bili: Negative / Urobili: 0.2 E.U./dL   Blood: x / Protein: NEGATIVE mg/dL / Nitrite: NEGATIVE   Leuk Esterase: NEGATIVE / RBC: x / WBC x   Sq Epi: x / Non Sq Epi: x / Bacteria: x        RADIOLOGY & ADDITIONAL TESTS:

## 2022-05-02 NOTE — PROGRESS NOTE ADULT - SUBJECTIVE AND OBJECTIVE BOX
Patient discussed on morning rounds with Dr. Delgado.    Reason for Admission: PFO closure pending CVA workup    SUBJECTIVE ASSESSMENT:  31y Male seen and examined. Patient has no complaints at this time. Very willing to go to acute rehab. Tolerating PO diet, satting well on room air, ambulating independently Denies lightheadedness, headache, CP, palpitations, SOB, abdominal pain, nausea, vomiting, fever, chills.      Vital Signs Last 24 Hrs  T(C): 36.6 (02 May 2022 14:14), Max: 37.1 (01 May 2022 18:16)  T(F): 97.9 (02 May 2022 14:14), Max: 98.7 (01 May 2022 18:16)  HR: 83 (02 May 2022 11:26) (58 - 83)  BP: 131/60 (02 May 2022 11:26) (115/59 - 131/60)  BP(mean): --  RR: 16 (02 May 2022 11:26) (16 - 18)  SpO2: 98% (02 May 2022 11:) (97% - 98%)  I&O's Detail    01 May 2022 07:01  -  02 May 2022 07:00  --------------------------------------------------------  IN:    Oral Fluid: 240 mL  Total IN: 240 mL    OUT:    Voided (mL): 600 mL  Total OUT: 600 mL    Total NET: -360 mL      02 May 2022 07:01  -  02 May 2022 16:45  --------------------------------------------------------  IN:    Oral Fluid: 360 mL  Total IN: 360 mL    OUT:  Total OUT: 0 mL    Total NET: 360 mL      CHEST TUBE:  No  RAUL DRAIN:  No.  EPICARDIAL WIRES: No.  TIE DOWNS: No.  HAMILTON: No.    PHYSICAL EXAM:    General: NAD, sitting comfortably in chair, conversing appropriately  Neurological: alert and oriented, UE and LE strength equal b/l, facial symmetry present  Cardiovascular: RRR, Clear S1 and S2, no murmurs appreciated  Respiratory: chest expansion symmetrical, CTA b/l, no wheezing noted  Gastrointestinal: +BS, soft, NT, ND  Extremities: moving spontaneously, no calf tenderness or edema.  Vascular: warm, well perfused. DP/PT pulses palpable b/l.  Incisions: none      LABS:                        12.3   4.57  )-----------( 158      ( 02 May 2022 05:43 )             37.9       COUMADIN:  No    PT/INR - ( 02 May 2022 05:43 )   PT: 13.1 sec;   INR: 1.10          PTT - ( 02 May 2022 05:43 )  PTT:23.8 sec        139  |  104  |  17  ----------------------------<  93  3.9   |  27  |  1.16    Ca    9.3      02 May 2022 05:43  Mg     1.9     05-02    TPro  6.5  /  Alb  3.6  /  TBili  0.2  /  DBili  <0.2  /  AST  57<H>  /  ALT  104<H>  /  AlkPhos  58  05-02      Urinalysis Basic - ( 2022 20:24 )    Color: Yellow / Appearance: SL Cloudy / S.020 / pH: x  Gluc: x / Ketone: NEGATIVE  / Bili: Negative / Urobili: 0.2 E.U./dL   Blood: x / Protein: NEGATIVE mg/dL / Nitrite: NEGATIVE   Leuk Esterase: NEGATIVE / RBC: x / WBC x   Sq Epi: x / Non Sq Epi: x / Bacteria: x        MEDICATIONS  (STANDING):  aspirin enteric coated 81 milliGRAM(s) Oral daily  clopidogrel Tablet 75 milliGRAM(s) Oral daily  heparin   Injectable 5000 Unit(s) SubCutaneous every 8 hours  pantoprazole    Tablet 40 milliGRAM(s) Oral before breakfast  sodium chloride 0.9% lock flush 3 milliLiter(s) IV Push every 8 hours    MEDICATIONS  (PRN):  meclizine 25 milliGRAM(s) Oral every 6 hours PRN Dizziness        RADIOLOGY & ADDITIONAL TESTS:

## 2022-05-03 ENCOUNTER — TRANSCRIPTION ENCOUNTER (OUTPATIENT)
Age: 32
End: 2022-05-03

## 2022-05-03 VITALS — TEMPERATURE: 98 F

## 2022-05-03 LAB
B2 GLYCOPROT1 AB SER QL: NEGATIVE — SIGNIFICANT CHANGE UP
CARDIOLIPIN AB SER-ACNC: NEGATIVE — SIGNIFICANT CHANGE UP

## 2022-05-03 PROCEDURE — 99223 1ST HOSP IP/OBS HIGH 75: CPT

## 2022-05-03 PROCEDURE — 93306 TTE W/DOPPLER COMPLETE: CPT | Mod: 26

## 2022-05-03 RX ORDER — PANTOPRAZOLE SODIUM 20 MG/1
1 TABLET, DELAYED RELEASE ORAL
Qty: 0 | Refills: 0 | DISCHARGE
Start: 2022-05-03

## 2022-05-03 RX ORDER — MECLIZINE HCL 12.5 MG
1 TABLET ORAL
Qty: 0 | Refills: 0 | DISCHARGE
Start: 2022-05-03

## 2022-05-03 RX ORDER — ASPIRIN/CALCIUM CARB/MAGNESIUM 324 MG
1 TABLET ORAL
Qty: 0 | Refills: 0 | DISCHARGE
Start: 2022-05-03

## 2022-05-03 RX ORDER — CLOPIDOGREL BISULFATE 75 MG/1
1 TABLET, FILM COATED ORAL
Qty: 0 | Refills: 0 | DISCHARGE
Start: 2022-05-03

## 2022-05-03 RX ADMIN — SODIUM CHLORIDE 3 MILLILITER(S): 9 INJECTION INTRAMUSCULAR; INTRAVENOUS; SUBCUTANEOUS at 06:31

## 2022-05-03 RX ADMIN — Medication 81 MILLIGRAM(S): at 11:52

## 2022-05-03 RX ADMIN — CLOPIDOGREL BISULFATE 75 MILLIGRAM(S): 75 TABLET, FILM COATED ORAL at 11:52

## 2022-05-03 RX ADMIN — PANTOPRAZOLE SODIUM 40 MILLIGRAM(S): 20 TABLET, DELAYED RELEASE ORAL at 06:31

## 2022-05-03 RX ADMIN — HEPARIN SODIUM 5000 UNIT(S): 5000 INJECTION INTRAVENOUS; SUBCUTANEOUS at 06:31

## 2022-05-03 NOTE — DISCHARGE NOTE PROVIDER - HOSPITAL COURSE
30 y/o M, , with no significant PMHx who presented to [facility] 7 days ago after experiencing an episode of tinnitus, dizziness, and vomiting when awaking from sleep. Pt wife called EMS who brought patient to [facility]. While there, patient underwent CT head in ED which was negative. However, subsequent CTA H/N revealed acute infarct in left superior cerebellar artery territory. Pt was admitted to ICU for closer monitoring.  MRI confirmed acute infarct involving the left superior cerebellar artery territory. Pt also treated for vertigo symptoms. Further w/u revealed +PFO confirmed by ECHO with bubble, EF 60-65%. JAROD positive for PFO, no thrombus. Of note, pt developed transaminitis after starting atorvastatin for CVA ppx. Pt admits to taking multiple OTC supplements including amino acids, protein, and creatinine. LFTs downtrended after stopping atorvastatin. Given +PFO with CVA, pt transferred to [facility] for further evaluation for possible intervention. Stroke team and hematology teams were consulted for further workup of CVA. Echo completed and showed mild MR, no pericardial effusion, and EF of 55%. Statin held at this time 2/2 increased LFTs at OSH. Patient will go to acute neuro rehab with [facility] while pending CVA workup. Tolerating PO diet, satting well on room air, ambulating independently. Per Dr. Delgado he is medically stable for discharge to acute rehab today, 5/3/22.    Over 35 minutes was spent with the patient reviewing the discharge material including medications, follow up appointments, recovery, concerning symptoms, and how to contact their health care providers if they have questions.   30 y/o M, , with no significant PMHx who presented to [facility] 7 days ago after experiencing an episode of tinnitus, dizziness, and vomiting when awaking from sleep. Pt wife called EMS who brought patient to [facility]. While there, patient underwent CT head in ED which was negative. However, subsequent CTA H/N revealed acute infarct in left superior cerebellar artery territory. Pt was admitted to ICU for closer monitoring.  MRI confirmed acute infarct involving the left superior cerebellar artery territory. Pt also treated for vertigo symptoms. Further w/u revealed +PFO confirmed by ECHO with bubble, EF 60-65%. JAROD positive for PFO, no thrombus. Of note, pt developed transaminitis after starting atorvastatin for CVA ppx. Pt admits to taking multiple OTC supplements including amino acids, protein, and creatinine. LFTs downtrended after stopping atorvastatin. Given +PFO with CVA, pt transferred to [facility] for further evaluation for possible intervention. Stroke team and hematology teams were consulted for further workup of CVA. Echo completed and showed mild MR, no pericardial effusion, and EF of 55%. Statin held at this time 2/2 increased LFTs at OSH. Patient will go to acute neuro rehab with OT while pending CVA workup. Tolerating PO diet, satting well on room air, ambulating independently. Per Dr. Delgado he is medically stable for discharge to acute rehab today, 5/3/22.    Over 35 minutes was spent with the patient reviewing the discharge material including medications, follow up appointments, recovery, concerning symptoms, and how to contact their health care providers if they have questions.                Patient found to have an ischemic stroke in L SCA territory seen on CTA H/N and MRI at [facility].   The stroke etiology workup still in progress    Physical exam at discharge:  -Mental status: Awake, alert, oriented to person, place, and time. Speech is fluent with intact naming, repetition, and comprehension, no dysarthria, + stuttering speech when speaking longer sentences at times. Recent and remote memory intact. Follows commands. Attention/concentration intact. Fund of knowledge appropriate.    -Cranial nerves:   II: Visual fields are full to confrontation.  III, IV, VI: Extraocular movements are intact without nystagmus. Pupils equally round and reactive to light  V:  Facial sensation V1-V3 equal and intact   VII: Face is symmetric with normal eye closure and smile  VIII: Hearing is bilaterally intact to finger rub  XII: Tongue protrudes midline  Motor: Normal bulk and tone. No pronator drift. Strength bilateral upper extremity 5/5, bilateral lower extremities 5/5.  + Dysdiadochokinesia - Left UE satellites around right UE, Left UE slower on rapid forearm rotation, slower on thumb opposition with left UE  Sensation: Intact to light touch bilaterally. No neglect or extinction on double simultaneous testing.  Coordination: + dysmetria on left finger to nose - zigzags to meet finger. No dysmetria on left LE on heel-to-shin  Reflexes: Downgoing toes bilaterally   Gait: initially a narrow based gait and gradually becomes wider gait as patient ambulates, ataxic on tandem gait, improved since admission    NIHSS: 1    New medications on discharge: aspirin 81mg, plavix 75mg  Labs to be followed up: hypercoagulable labs, free testosterone

## 2022-05-03 NOTE — DISCHARGE NOTE PROVIDER - CARE PROVIDERS DIRECT ADDRESSES
,[email or web address]

## 2022-05-03 NOTE — DISCHARGE NOTE PROVIDER - NSDCFUSCHEDAPPT_GEN_ALL_CORE_FT
[facility]  Neuro [address]  Scheduled Appointment: 05/12/2022    [facility]  HemOnc [address]  Scheduled Appointment: 05/24/2022     Niraj Delgado  [facility]  CT Surg [address]  Scheduled Appointment: 05/09/2022    Indira Arteaga  [facility]  Neuro [address]  Scheduled Appointment: 05/12/2022    Melody Floyd  [facility]  HemOnc [address]  Scheduled Appointment: 05/24/2022     Niraj Delgado  [facility] Physician [facility]  CT Surg [address]  Scheduled Appointment: 05/09/2022    Indira Arteaga  [facility]  Neuro [address]  Scheduled Appointment: 05/19/2022    Melody Floyd  [facility]  HemOnc [address]  Scheduled Appointment: 05/24/2022

## 2022-05-03 NOTE — DISCHARGE NOTE PROVIDER - NSDCCPCAREPLAN_GEN_ALL_CORE_FT
PRINCIPAL DISCHARGE DIAGNOSIS  Diagnosis: PFO (patent foramen ovale)  Assessment and Plan of Treatment:       SECONDARY DISCHARGE DIAGNOSES  Diagnosis: Cerebrovascular accident (CVA)  Assessment and Plan of Treatment:

## 2022-05-03 NOTE — DISCHARGE NOTE NURSING/CASE MANAGEMENT/SOCIAL WORK - PATIENT PORTAL LINK FT
You can access the FollowMyHealth Patient Portal offered by [facility] by registering at the following website: [email or web address]. By joining [employer]’s FollowMyHealth portal, you will also be able to view your health information using other applications (apps) compatible with our system.

## 2022-05-03 NOTE — DISCHARGE NOTE PROVIDER - NSDCFUADDAPPT_GEN_ALL_CORE_FT
-The office will call you with your scheduled appointment with Dr. Delgado. If you do not hear from them by 5/5, call them at [phone]. normal...

## 2022-05-03 NOTE — DISCHARGE NOTE PROVIDER - CARE PROVIDER_API CALL
Niraj Delgado)  Cardiology; Interventional Cardiology  [address] [place] [address]  Phone: [phone]  Fax: [phone]  Follow Up Time: 1 week    AMMY ASHFORD  Pediatrics  [address] [place] [address]  Phone: ([phone]  Fax: ()-  Scheduled Appointment: 05/24/2022 11:30 AM    John Blanco)  Neurology; Vascular Neurology  [address], [address] [place] [address]  Phone: [phone]  Fax: [phone]  Scheduled Appointment: 05/12/2022 03:00 PM

## 2022-05-03 NOTE — DISCHARGE NOTE NURSING/CASE MANAGEMENT/SOCIAL WORK - NSDCFUADDAPPT_GEN_ALL_CORE_FT
-The office will call you with your scheduled appointment with Dr. Delgado. If you do not hear from them by 5/5, call them at [phone].

## 2022-05-03 NOTE — PROGRESS NOTE ADULT - SUBJECTIVE AND OBJECTIVE BOX
Patient discussed on morning rounds with       Operation / Date:     Surgeon:    Referring Physician:    SUBJECTIVE ASSESSMENT:  31y Male     HOSPITAL COURSE:  32 y/o M, , with no significant PMHx who presented to [facility] 7 days ago after experiencing an episode of tinnitus, dizziness, and vomiting when awaking from sleep. Pt wife called EMS who brought patient to [facility]. While there, patient underwent CT head in ED which was negative. However, subsequent CTA H/N revealed acute infarct in left superior cerebellar artery territory. Pt was admitted to ICU for closer monitoring.  MRI confirmed acute infarct involving the left superior cerebellar artery territory. Pt also treated for vertigo symptoms. Further w/u revealed +PFO confirmed by ECHO with bubble, EF 60-65%. JAROD positive for PFO, no thrombus. Of note, pt developed transaminitis after starting atorvastatin for CVA ppx. Pt admits to taking multiple OTC supplements including amino acids, protein, and creatinine. LFTs downtrended after stopping atorvastatin. Given +PFO with CVA, pt transferred to [facility] for further evaluation for possible intervention. Stroke team and hematology teams were consulted for further workup of CVA. Echo completed and showed mild MR, no pericardial effusion, and EF of 55%. Statin held at this time 2/2 increased LFTs at OSH. Patient will go to acute neuro rehab with OT while pending CVA workup. Tolerating PO diet, satting well on room air, ambulating independently. Per Dr. Delgado he is medically stable for discharge to acute rehab today, 5/3/22.      Vital Signs Last 24 Hrs  T(C): 36.5 (03 May 2022 10:16), Max: 37.1 (02 May 2022 22:30)  T(F): 97.7 (03 May 2022 10:16), Max: 98.8 (02 May 2022 22:30)  HR: 74 (03 May 2022 11:47) (56 - 84)  BP: 148/70 (03 May 2022 11:47) (117/61 - 148/70)  BP(mean): 81 (03 May 2022 05:13) (77 - 92)  RR: 16 (03 May 2022 11:47) (16 - 16)  SpO2: 95% (03 May 2022 11:47) (95% - 98%)    EPICARDIAL WIRES REMOVED: Yes/No.  TIE DOWNS REMOVED: Yes/No.    PHYSICAL EXAM:    General:     Neurological:    Cardiovascular:    Respiratory:    Gastrointestinal:    Extremities:    Vascular:    Incision Sites:    LABS:                        12.3   4.57  )-----------( 158      ( 02 May 2022 05:43 )             37.9       COUMADIN:  Yes/No.        DOSE:                  INDICATION:                GOAL INR:    PT/INR - ( 02 May 2022 05:43 )   PT: 13.1 sec;   INR: 1.10          PTT - ( 02 May 2022 05:43 )  PTT:23.8 sec    05-02    139  |  104  |  17  ----------------------------<  93  3.9   |  27  |  1.16    Ca    9.3      02 May 2022 05:43  Mg     1.9     05-02    TPro  6.5  /  Alb  3.6  /  TBili  0.2  /  DBili  <0.2  /  AST  57<H>  /  ALT  104<H>  /  AlkPhos  58  05-02          Discharge CXR:    Discharge ECHO:     Patient discussed on morning rounds with Dr. Delgado    Reason for admission: +PFO after CVA    Surgeon: Dr. Delgado    Referring Physician: none    SUBJECTIVE ASSESSMENT:  31y Male seen and examined. Patient feels well and excited to go to rehab and "get better". No complaints. Tolerating PO diet, satting well on room air, ambulating independently. Denies lightheadedness, headache, CP, palpitations, SOB, abdominal pain, nausea, vomiting, fever, chills.    HOSPITAL COURSE:  30 y/o M, , with no significant PMHx who presented to [facility] 7 days ago after experiencing an episode of tinnitus, dizziness, and vomiting when awaking from sleep. Pt wife called EMS who brought patient to [facility]. While there, patient underwent CT head in ED which was negative. However, subsequent CTA H/N revealed acute infarct in left superior cerebellar artery territory. Pt was admitted to ICU for closer monitoring.  MRI confirmed acute infarct involving the left superior cerebellar artery territory. Pt also treated for vertigo symptoms. Further w/u revealed +PFO confirmed by ECHO with bubble, EF 60-65%. JAROD positive for PFO, no thrombus. Of note, pt developed transaminitis after starting atorvastatin for CVA ppx. Pt admits to taking multiple OTC supplements including amino acids, protein, and creatinine. LFTs downtrended after stopping atorvastatin. Given +PFO with CVA, pt transferred to [facility] for further evaluation for possible intervention. Stroke team and hematology teams were consulted for further workup of CVA. Echo completed and showed mild MR, no pericardial effusion, and EF of 55%. Statin held at this time 2/2 increased LFTs at OSH. Patient will go to acute neuro rehab with OT while pending CVA workup. Tolerating PO diet, satting well on room air, ambulating independently. Per Dr. Delgado he is medically stable for discharge to acute rehab today, 5/3/22.      Vital Signs Last 24 Hrs  T(C): 36.5 (03 May 2022 10:16), Max: 37.1 (02 May 2022 22:30)  T(F): 97.7 (03 May 2022 10:16), Max: 98.8 (02 May 2022 22:30)  HR: 74 (03 May 2022 11:47) (56 - 84)  BP: 148/70 (03 May 2022 11:47) (117/61 - 148/70)  BP(mean): 81 (03 May 2022 05:13) (77 - 92)  RR: 16 (03 May 2022 11:47) (16 - 16)  SpO2: 95% (03 May 2022 11:47) (95% - 98%)    EPICARDIAL WIRES REMOVED: n/a  TIE DOWNS REMOVED: n/a    PHYSICAL EXAM:    General: NAD, sitting comfortably in chair, conversing appropriately  Neurological: alert and oriented, UE and LE strength equal b/l, facial symmetry present  Cardiovascular: RRR, Clear S1 and S2, no murmurs appreciated  Respiratory: chest expansion symmetrical, CTA b/l, no wheezing noted  Gastrointestinal: +BS, soft, NT, ND  Extremities: moving spontaneously, no calf tenderness or edema.  Vascular: warm, well perfused. DP/PT pulses palpable b/l.  Incisions: none      LABS:                        12.3   4.57  )-----------( 158      ( 02 May 2022 05:43 )             37.9       COUMADIN:  no    PT/INR - ( 02 May 2022 05:43 )   PT: 13.1 sec;   INR: 1.10          PTT - ( 02 May 2022 05:43 )  PTT:23.8 sec    05-02    139  |  104  |  17  ----------------------------<  93  3.9   |  27  |  1.16    Ca    9.3      02 May 2022 05:43  Mg     1.9     05-02    TPro  6.5  /  Alb  3.6  /  TBili  0.2  /  DBili  <0.2  /  AST  57<H>  /  ALT  104<H>  /  AlkPhos  58  05-02          Discharge CXR:  < from: Xray Chest 1 View AP/PA (04.30.22 @ 18:49) >    INTERPRETATION:  Clinical History: pfo    Frontal examination of the chest demonstrates the heart to be within   normal limits in transverse diameter. No acute infiltrates. Visualized   osseous structures are within normal limits.    IMPRESSION: No acute infiltrates    < end of copied text >      Discharge ECHO:    < from: TTE Echo Complete w/o Contrast w/ Doppler (05.03.22 @ 09:00) >  CONCLUSIONS:     1. Mildly dilated left atrium.   2. On Color flow and spectral Doppler interrogation, cannot rule out   presence of a patent foramen ovale.   3. Mild mitral regurgitation.   4. No other significant valvular disease.   5. The right ventricle lateral wall is not well visualized. The right   ventricle is probably normal in size. Right ventricular systolic function   is normal.   6. The left ventricle is normal in size, wall thickness, and systolic   function with a calculated ejection fraction of 55%.   7. No pericardial effusion.   8. No prior echo is available for comparison.    < end of copied text >

## 2022-05-03 NOTE — DISCHARGE NOTE NURSING/CASE MANAGEMENT/SOCIAL WORK - NSDCPEFALRISK_GEN_ALL_CORE
For information on Fall & Injury Prevention, visit: [email or web address] OR  [email or web address] OR  https://www.cdc.gov/steadi/patient.html

## 2022-05-03 NOTE — DISCHARGE NOTE PROVIDER - PROVIDER TOKENS
PROVIDER:[TOKEN:[[id number]],FOLLOWUP:[1 week]],PROVIDER:[TOKEN:[[id number]],SCHEDULEDAPPT:[05/24/2022],SCHEDULEDAPPTTIME:[11:30 AM]],PROVIDER:[TOKEN:[[id number]],SCHEDULEDAPPT:[05/12/2022],SCHEDULEDAPPTTIME:[03:00 PM]]

## 2022-05-03 NOTE — DISCHARGE NOTE PROVIDER - NSDCMRMEDTOKEN_GEN_ALL_CORE_FT
aspirin 81 mg oral delayed release tablet: 1 tab(s) orally once a day  clopidogrel 75 mg oral tablet: 1 tab(s) orally once a day  meclizine 25 mg oral tablet: 1 tab(s) orally every 6 hours, As needed, Dizziness  pantoprazole 40 mg oral delayed release tablet: 1 tab(s) orally once a day (before a meal)

## 2022-05-03 NOTE — DISCHARGE NOTE PROVIDER - NSDCFUADDINST_GEN_ALL_CORE_FT
You had a MCOT monitor (an external cardiac rhythm monitoring device) placed on your day of discharge.  This helps us monitor your heart while you are out of the hospital for 30 days after discharge. Should your heart go into an abnormal or dangerous rhythm you will receive a call from the MCOT team and your Structural Heart team of Doctors and PA's will be notified.    1. Keep the monitor within 30 feet of you at all times.  2. When you feel any symptom (chest pain, dizziness, palpitations, weakness, fatigue or anything outside of your normal), press the “Record Symptoms” button on the main phone of your phone  3. Shower or exercise as normal whilewearing the MCOT Patch. Do not swim or take a bath. Patch is water-resistant, not waterproof  4. When the battery is low on the phone or on the device, use the supplied . The monitor will show a warning message when the battery is low.  5. Do not remove the patch from your skin after you begin monitoring. With normal wear, each patch should last 5 days. To replace the patch follow instructions in the MCOT box with the Patch Guide  6. Any issues with the MCOT device or phone please call [employer] at [phone].  7. If you have any other questions at all please call the Structural Heart office at [phone]    -Walk daily as tolerated and use your incentive spirometer 10 times every hour while you are awake.     -Please weigh yourself daily. If you notice over a 3 pound weight gain in 3 days, this is a sign you are likely retaining too much fluid. It is imperative you call our right away with unexplained rapid weight gain.      -Please continue to wear the compression stockings given to you in the hospital at home. This is a way to prevent fluid from building up in your legs.     -No driving or strenuous activity/exercise until cleared by your surgeon.    -Call your doctor if you have shortness of breath, chest pain not relieved by pain medication, dizziness, fever >101.5, or increased redness or drainage from incisions.

## 2022-05-03 NOTE — PROGRESS NOTE ADULT - ASSESSMENT
Niraj Delgado)  Cardiology; Interventional Cardiology  [address] [place] [address]  Phone: [phone]  Fax: [phone]  Follow Up Time: 1 week    INDIRA ASHFORD  Pediatrics  [address] [place] [address]  Phone: ([phone]  Fax: ([phone]  Scheduled Appointment: 05/24/2022 11:30 AM    John Blanco)  Neurology; Vascular Neurology  [address] [place] [address]  Phone: [phone]  Fax: [phone]  Scheduled Appointment: 05/12/2022 03:00 PM  Niraj Delgado  [facility] Physician [facility]  CT Surg [address]  Scheduled Appointment: 05/09/2022    Indira Arteaga  [facility] Physician [facility]  Neuro [address]  Scheduled Appointment: 05/12/2022    Melody Floyd  [facility] Physician [facility]  HemOnc [address]  Scheduled Appointment: 05/24/2022    -The office will call you with your scheduled appointment with Dr. Delgado. If you do not hear from them by 5/5, call them at [phone].

## 2022-05-04 LAB
AT III ACT/NOR PPP CHRO: 76 % — LOW (ref 85–135)
AT III AG PPP IA-MCNC: 23 MG/DL — SIGNIFICANT CHANGE UP (ref 19–31)
PROT C ACT/NOR PPP: 110 % — SIGNIFICANT CHANGE UP (ref 74–150)

## 2022-05-05 LAB
DNA PLOIDY SPEC FC-IMP: SIGNIFICANT CHANGE UP
PTR INTERPRETATION: SIGNIFICANT CHANGE UP

## 2022-05-06 LAB — APCR PPP: 2.63 RATIO — SIGNIFICANT CHANGE UP

## 2022-05-09 ENCOUNTER — APPOINTMENT (OUTPATIENT)
Dept: CARDIOTHORACIC SURGERY | Facility: CLINIC | Age: 32
End: 2022-05-09

## 2022-05-09 DIAGNOSIS — R47.82 FLUENCY DISORDER IN CONDITIONS CLASSIFIED ELSEWHERE: ICD-10-CM

## 2022-05-09 DIAGNOSIS — R27.0 ATAXIA, UNSPECIFIED: ICD-10-CM

## 2022-05-09 DIAGNOSIS — R29.702 NIHSS SCORE 2: ICD-10-CM

## 2022-05-09 DIAGNOSIS — R74.01 ELEVATION OF LEVELS OF LIVER TRANSAMINASE LEVELS: ICD-10-CM

## 2022-05-09 DIAGNOSIS — Q21.1 ATRIAL SEPTAL DEFECT: ICD-10-CM

## 2022-05-09 DIAGNOSIS — I63.542 CEREBRAL INFARCTION DUE TO UNSPECIFIED OCCLUSION OR STENOSIS OF LEFT CEREBELLAR ARTERY: ICD-10-CM

## 2022-05-09 DIAGNOSIS — R27.8 OTHER LACK OF COORDINATION: ICD-10-CM

## 2022-05-12 ENCOUNTER — NON-APPOINTMENT (OUTPATIENT)
Age: 32
End: 2022-05-12

## 2022-05-19 ENCOUNTER — APPOINTMENT (OUTPATIENT)
Dept: NEUROLOGY | Facility: CLINIC | Age: 32
End: 2022-05-19
Payer: MEDICAID

## 2022-05-19 VITALS
BODY MASS INDEX: 27.96 KG/M2 | OXYGEN SATURATION: 99 % | DIASTOLIC BLOOD PRESSURE: 75 MMHG | HEART RATE: 72 BPM | WEIGHT: 211 LBS | SYSTOLIC BLOOD PRESSURE: 122 MMHG | HEIGHT: 73 IN | TEMPERATURE: 97.9 F

## 2022-05-19 DIAGNOSIS — R74.01 ELEVATION OF LEVELS OF LIVER TRANSAMINASE LEVELS: ICD-10-CM

## 2022-05-19 PROCEDURE — 99215 OFFICE O/P EST HI 40 MIN: CPT

## 2022-05-19 NOTE — HISTORY OF PRESENT ILLNESS
[FreeTextEntry1] : Pt is a 30 y/o M, , with no significant PMHx who presents today for hospital f/u visit. He initially presented to [facility] April 23rd after experiencing an episode of tinnitus, dizziness, and vomiting when awaking from sleep. Pt wife called EMS who brought patient to [facility]. While there, patient underwent CT head in ED which was negative. However, subsequent CTA H/N revealed acute infarct in left superior cerebellar artery territory. Pt was admitted to ICU for closer monitoring.  MRI confirmed acute infarct involving the left superior cerebellar artery territory, multiple old strokes . Pt also treated for vertigo symptoms. Further w/u revealed +PFO confirmed by ECHO with bubble, EF 60-65%. JAROD positive for PFO, no thrombus. Of note, pt developed transaminitis after starting atorvastatin for CVA ppx. Pt admits to taking multiple OTC supplements including amino acids, protein, and creatine. LFTs downtrended after stopping atorvastatin. Given +PFO with CVA, pt transferred to [facility] for further evaluation for possible intervention. Stroke team and hematology teams were consulted for further workup of CVA. Echo completed and showed mild MR, no pericardial effusion, and EF of 55%. Statin held 2/2 increased LFTs at OSH. Patient d/c to rehab with MCOT in place. Pt is happy to report he is significantly improved after extensive rehab. He was d/c home 2 days ago. He still has residual poor coordination in L hand as well as worse balance, but these are much better. He denies any new stroke-like episodes. He is still on ASA 81mg daily, although his plavix was stopped about 1 week ago while still at rehab for continued rise in his LFTs despite being off of his statin. Repeat BW showed resolution of his elevated LFTs. (He had repeat BW 2 days ago). \par  \par

## 2022-05-19 NOTE — ASSESSMENT
[FreeTextEntry1] : Pt is a 32yo Male, , with no prior PMHx presents for hospital f/u. He initially presented to [facility] on 4/23 after experiencing an episode of acute R ear tinnitus, dizziness, and vomiting when awaking from sleep, found to have acute to subacute left SCA infarct on CTA h/n and MRI with residual left sided dysmetria and ataxia, c/b transaminitis after starting statin, transferred to [facility] under [facility]x for +PFO found on JAROD. Now pending PFO closure with Dr. Delgado. MCOT in place. \par \par Plan \par \par #1 CVA\par Continue Aspirin 81mg daily for secondary stroke prevention. Will discuss with Dr. Floyd and Dr. Delgado re: antiplatelets (increase ASA to full ASA??) now that he is off plavix... \par F/u with heme to r/o coagulopathy \par F/u with CTSX for PFO closure \par Will consider referring pt to EP for ILR placement if short term monitoring reveals no arrhythmias... (I have recently been finding more PAF on younger patients so would err on the side of caution...) \par Will refer to GI for persistently elevated LFTs \par Referrals for OT and VT for balance and L hand incoordination after stroke \par Pt requested to send in discs of MRIs and imaging from [facility] to be uploaded to PACs. \par -Counselled on healthy eating (DASH/ Mediterranean diet, limiting red meats)\par -Counselled on importance of regular exercise and remaining active\par -Continue to f/u with PCP regarding regular health maintenance and prevention, including routine screening \par -Counselled on signs of stroke BEFAST and to call 911 with any new or worsening neurological symptoms \par

## 2022-05-19 NOTE — PHYSICAL EXAM
[FreeTextEntry1] : -Mental status: Awake, alert, oriented to person, place, and time. Speech is fluent with intact naming, repetition, and comprehension, no dysarthria, + stuttering speech when speaking longer sentences at times. Recent and remote memory intact. Follows commands. Attention/concentration intact. Fund of knowledge appropriate.\par \par -Cranial nerves: \par II: Visual fields are full to confrontation.\par III, IV, VI: Extraocular movements are intact without nystagmus. Pupils equally round and reactive to light\par V:  Facial sensation V1-V3 equal and intact \par VII: Face is symmetric with normal eye closure and smile\par VIII: Hearing is bilaterally intact to finger rub\par XII: Tongue protrudes midline\par Motor: Normal bulk and tone. No pronator drift. Strength bilateral upper extremity 5/5, bilateral lower extremities 5/5.\par + Dysdiadochokinesia - Left, difficulty with rapid alternating movements \par Sensation: Intact to light touch bilaterally. No neglect or extinction on double simultaneous testing.\par Coordination: + dysmetria on left finger to nose - zigzags to meet finger. No dysmetria on left LE on heel-to-shin. RUE RLE intact with no dysmetria \par Reflexes: Downgoing toes bilaterally \par Gait: narrow based, slightly ataxic. Difficulty with tandem gait. Worse after turning. \par

## 2022-05-23 ENCOUNTER — APPOINTMENT (OUTPATIENT)
Dept: CARDIOTHORACIC SURGERY | Facility: CLINIC | Age: 32
End: 2022-05-23
Payer: MEDICAID

## 2022-05-23 DIAGNOSIS — Z86.73 PERSONAL HISTORY OF TRANSIENT ISCHEMIC ATTACK (TIA), AND CEREBRAL INFARCTION W/OUT RESIDUAL DEFICITS: ICD-10-CM

## 2022-05-23 PROCEDURE — 99213 OFFICE O/P EST LOW 20 MIN: CPT | Mod: 95

## 2022-05-24 ENCOUNTER — APPOINTMENT (OUTPATIENT)
Dept: HEMATOLOGY ONCOLOGY | Facility: CLINIC | Age: 32
End: 2022-05-24
Payer: MEDICAID

## 2022-05-24 VITALS
HEIGHT: 73.5 IN | HEART RATE: 76 BPM | DIASTOLIC BLOOD PRESSURE: 75 MMHG | WEIGHT: 211 LBS | OXYGEN SATURATION: 99 % | SYSTOLIC BLOOD PRESSURE: 125 MMHG | TEMPERATURE: 97.5 F | BODY MASS INDEX: 27.37 KG/M2 | RESPIRATION RATE: 15 BRPM

## 2022-05-24 DIAGNOSIS — Z78.9 OTHER SPECIFIED HEALTH STATUS: ICD-10-CM

## 2022-05-24 DIAGNOSIS — Z82.49 FAMILY HISTORY OF ISCHEMIC HEART DISEASE AND OTHER DISEASES OF THE CIRCULATORY SYSTEM: ICD-10-CM

## 2022-05-24 PROBLEM — Z86.73 HISTORY OF CEREBROVASCULAR ACCIDENT: Status: RESOLVED | Noted: 2022-05-24 | Resolved: 2022-05-24

## 2022-05-24 PROCEDURE — 99214 OFFICE O/P EST MOD 30 MIN: CPT

## 2022-05-24 NOTE — ASSESSMENT
[FreeTextEntry1] : Discussed with patient in detail the need to DC all OTC meds as they can be harmful.\par \par Will repeat Antithrombin III level, if normal I agree with the need to close the PFO.\par \par \par All this was discussed with patient in detail.\par \par Follow-up here as needed.

## 2022-05-24 NOTE — HISTORY OF PRESENT ILLNESS
[de-identified] : 31 years old black male returns for follow-up after having had a stroke at [facility] in April... He is a  and takes OTC meds... He was found to have a PFO... Hypercoagulable work-up just revealed a low Antithrombin III, which could have been secondary to the stroke... We will repeat...

## 2022-05-29 LAB
25(OH)D3 SERPL-MCNC: 25.7 NG/ML
AT III PPP CHRO-ACNC: 91 %

## 2022-05-30 PROCEDURE — 85303 CLOT INHIBIT PROT C ACTIVITY: CPT

## 2022-05-30 PROCEDURE — 80048 BASIC METABOLIC PNL TOTAL CA: CPT

## 2022-05-30 PROCEDURE — 85598 HEXAGNAL PHOSPH PLTLT NEUTRL: CPT

## 2022-05-30 PROCEDURE — 82306 VITAMIN D 25 HYDROXY: CPT

## 2022-05-30 PROCEDURE — 84443 ASSAY THYROID STIM HORMONE: CPT

## 2022-05-30 PROCEDURE — 85025 COMPLETE CBC W/AUTO DIFF WBC: CPT

## 2022-05-30 PROCEDURE — 82607 VITAMIN B-12: CPT

## 2022-05-30 PROCEDURE — 81240 F2 GENE: CPT

## 2022-05-30 PROCEDURE — 86900 BLOOD TYPING SEROLOGIC ABO: CPT

## 2022-05-30 PROCEDURE — 85301 ANTITHROMBIN III ANTIGEN: CPT

## 2022-05-30 PROCEDURE — 80076 HEPATIC FUNCTION PANEL: CPT

## 2022-05-30 PROCEDURE — 85027 COMPLETE CBC AUTOMATED: CPT

## 2022-05-30 PROCEDURE — 93970 EXTREMITY STUDY: CPT

## 2022-05-30 PROCEDURE — 97161 PT EVAL LOW COMPLEX 20 MIN: CPT

## 2022-05-30 PROCEDURE — 83735 ASSAY OF MAGNESIUM: CPT

## 2022-05-30 PROCEDURE — 83090 ASSAY OF HOMOCYSTEINE: CPT

## 2022-05-30 PROCEDURE — 86850 RBC ANTIBODY SCREEN: CPT

## 2022-05-30 PROCEDURE — U0003: CPT

## 2022-05-30 PROCEDURE — 36415 COLL VENOUS BLD VENIPUNCTURE: CPT

## 2022-05-30 PROCEDURE — 81241 F5 GENE: CPT

## 2022-05-30 PROCEDURE — 85610 PROTHROMBIN TIME: CPT

## 2022-05-30 PROCEDURE — 86901 BLOOD TYPING SEROLOGIC RH(D): CPT

## 2022-05-30 PROCEDURE — U0005: CPT

## 2022-05-30 PROCEDURE — 85613 RUSSELL VIPER VENOM DILUTED: CPT

## 2022-05-30 PROCEDURE — 71045 X-RAY EXAM CHEST 1 VIEW: CPT

## 2022-05-30 PROCEDURE — 86147 CARDIOLIPIN ANTIBODY EA IG: CPT

## 2022-05-30 PROCEDURE — 86146 BETA-2 GLYCOPROTEIN ANTIBODY: CPT

## 2022-05-30 PROCEDURE — 97535 SELF CARE MNGMENT TRAINING: CPT

## 2022-05-30 PROCEDURE — 80053 COMPREHEN METABOLIC PANEL: CPT

## 2022-05-30 PROCEDURE — 85300 ANTITHROMBIN III ACTIVITY: CPT

## 2022-05-30 PROCEDURE — 93005 ELECTROCARDIOGRAM TRACING: CPT

## 2022-05-30 PROCEDURE — 85730 THROMBOPLASTIN TIME PARTIAL: CPT

## 2022-05-30 PROCEDURE — 83880 ASSAY OF NATRIURETIC PEPTIDE: CPT

## 2022-05-30 PROCEDURE — 93880 EXTRACRANIAL BILAT STUDY: CPT

## 2022-05-30 PROCEDURE — 83036 HEMOGLOBIN GLYCOSYLATED A1C: CPT

## 2022-05-30 PROCEDURE — 93306 TTE W/DOPPLER COMPLETE: CPT

## 2022-05-30 PROCEDURE — 85307 ASSAY ACTIVATED PROTEIN C: CPT

## 2022-05-31 ENCOUNTER — NON-APPOINTMENT (OUTPATIENT)
Age: 32
End: 2022-05-31

## 2022-05-31 ENCOUNTER — APPOINTMENT (OUTPATIENT)
Dept: CARDIOTHORACIC SURGERY | Facility: CLINIC | Age: 32
End: 2022-05-31

## 2022-06-04 ENCOUNTER — APPOINTMENT (OUTPATIENT)
Dept: HEART AND VASCULAR | Facility: CLINIC | Age: 32
End: 2022-06-04
Payer: MEDICAID

## 2022-06-04 PROCEDURE — 93272 ECG/REVIEW INTERPRET ONLY: CPT

## 2022-06-04 PROCEDURE — 93228 REMOTE 30 DAY ECG REV/REPORT: CPT

## 2022-06-12 NOTE — REVIEW OF SYSTEMS
[Anxiety] : anxiety [Under Stress] : under stress [Negative] : Heme/Lymph [de-identified] : +Left hand coordination deficit

## 2022-06-12 NOTE — HISTORY OF PRESENT ILLNESS
[FreeTextEntry1] : This visit was provided via telehealth using real-time 2-way audio visual technology. The patient, ROBBIN WEN, was located at home, [address] [place] [address] [address] [place] [address] , at the time of the visit. The provider was located at [address] [place] [address]. The patient, ROBBIN WEN, Dr. Niraj Delgado and SHELLY GAN all participated in the telehealth encounter. Verbal consent given on 05/24/2022 by ROBBIN BEHZAD. \par \par 32 y/o Male, , previously with no significant PMHx who recently presented to [facility] after experiencing an episode of tinnitus, dizziness, and vomiting when awaking from sleep, found to have acute infarct involving the left superior cerebellar artery territory seen on MRI, and a PFO seen on JAROD, presents for follow up after discharge from neuro rehab. \par \par Patient still is wearing his MCOT with 7 days left. He saw neurology, Dr. Arteaga, and will be following up with hematology, Dr. Floyd to complete hypercoagulable workup.\par \par Patient reports feeling happy to be home now, but does state he feels some anxiety which he described as "PTSD" from the night of symptoms onset. He reports that he still has left hand coordination issues and still ambulates cautiously.  Patient continues to work with OT/PT. He denies chest pain, palpitations, SOB, orthopnea, PND, dizziness, or LE edema. \par

## 2022-06-20 ENCOUNTER — APPOINTMENT (OUTPATIENT)
Dept: CARDIOTHORACIC SURGERY | Facility: CLINIC | Age: 32
End: 2022-06-20

## 2022-06-28 ENCOUNTER — NON-APPOINTMENT (OUTPATIENT)
Age: 32
End: 2022-06-28

## 2022-07-14 ENCOUNTER — NON-APPOINTMENT (OUTPATIENT)
Age: 32
End: 2022-07-14

## 2022-08-08 ENCOUNTER — APPOINTMENT (OUTPATIENT)
Dept: CARDIOTHORACIC SURGERY | Facility: CLINIC | Age: 32
End: 2022-08-08

## 2022-08-15 ENCOUNTER — OUTPATIENT (OUTPATIENT)
Dept: OUTPATIENT SERVICES | Facility: HOSPITAL | Age: 32
LOS: 1 days | End: 2022-08-15
Payer: MEDICAID

## 2022-08-15 ENCOUNTER — APPOINTMENT (OUTPATIENT)
Dept: CARDIOTHORACIC SURGERY | Facility: CLINIC | Age: 32
End: 2022-08-15

## 2022-08-15 ENCOUNTER — LABORATORY RESULT (OUTPATIENT)
Age: 32
End: 2022-08-15

## 2022-08-15 DIAGNOSIS — Q21.1 ATRIAL SEPTAL DEFECT: ICD-10-CM

## 2022-08-15 LAB
ALBUMIN SERPL ELPH-MCNC: 4.4 G/DL — SIGNIFICANT CHANGE UP (ref 3.3–5)
ALP SERPL-CCNC: 77 U/L — SIGNIFICANT CHANGE UP (ref 40–120)
ALT FLD-CCNC: 42 U/L — SIGNIFICANT CHANGE UP (ref 10–45)
ANION GAP SERPL CALC-SCNC: 11 MMOL/L — SIGNIFICANT CHANGE UP (ref 5–17)
APTT BLD: 25 SEC — LOW (ref 27.5–35.5)
AST SERPL-CCNC: 42 U/L — HIGH (ref 10–40)
BASOPHILS # BLD AUTO: 0.05 K/UL — SIGNIFICANT CHANGE UP (ref 0–0.2)
BASOPHILS NFR BLD AUTO: 0.9 % — SIGNIFICANT CHANGE UP (ref 0–2)
BILIRUB SERPL-MCNC: 0.3 MG/DL — SIGNIFICANT CHANGE UP (ref 0.2–1.2)
BUN SERPL-MCNC: 26 MG/DL — HIGH (ref 7–23)
CALCIUM SERPL-MCNC: 10 MG/DL — SIGNIFICANT CHANGE UP (ref 8.4–10.5)
CHLORIDE SERPL-SCNC: 105 MMOL/L — SIGNIFICANT CHANGE UP (ref 96–108)
CO2 SERPL-SCNC: 23 MMOL/L — SIGNIFICANT CHANGE UP (ref 22–31)
CREAT SERPL-MCNC: 1.18 MG/DL — SIGNIFICANT CHANGE UP (ref 0.5–1.3)
EGFR: 84 ML/MIN/1.73M2 — SIGNIFICANT CHANGE UP
EOSINOPHIL # BLD AUTO: 0.36 K/UL — SIGNIFICANT CHANGE UP (ref 0–0.5)
EOSINOPHIL NFR BLD AUTO: 6.3 % — HIGH (ref 0–6)
GLUCOSE SERPL-MCNC: 80 MG/DL — SIGNIFICANT CHANGE UP (ref 70–99)
HCT VFR BLD CALC: 42.2 % — SIGNIFICANT CHANGE UP (ref 39–50)
HGB BLD-MCNC: 13.9 G/DL — SIGNIFICANT CHANGE UP (ref 13–17)
IMM GRANULOCYTES NFR BLD AUTO: 0 % — SIGNIFICANT CHANGE UP (ref 0–1.5)
INR BLD: 1.04 — SIGNIFICANT CHANGE UP (ref 0.88–1.16)
LYMPHOCYTES # BLD AUTO: 1.5 K/UL — SIGNIFICANT CHANGE UP (ref 1–3.3)
LYMPHOCYTES # BLD AUTO: 26.4 % — SIGNIFICANT CHANGE UP (ref 13–44)
MCHC RBC-ENTMCNC: 27.9 PG — SIGNIFICANT CHANGE UP (ref 27–34)
MCHC RBC-ENTMCNC: 32.9 GM/DL — SIGNIFICANT CHANGE UP (ref 32–36)
MCV RBC AUTO: 84.6 FL — SIGNIFICANT CHANGE UP (ref 80–100)
MONOCYTES # BLD AUTO: 0.41 K/UL — SIGNIFICANT CHANGE UP (ref 0–0.9)
MONOCYTES NFR BLD AUTO: 7.2 % — SIGNIFICANT CHANGE UP (ref 2–14)
NEUTROPHILS # BLD AUTO: 3.37 K/UL — SIGNIFICANT CHANGE UP (ref 1.8–7.4)
NEUTROPHILS NFR BLD AUTO: 59.2 % — SIGNIFICANT CHANGE UP (ref 43–77)
NRBC # BLD: 0 /100 WBCS — SIGNIFICANT CHANGE UP (ref 0–0)
PLATELET # BLD AUTO: 185 K/UL — SIGNIFICANT CHANGE UP (ref 150–400)
POTASSIUM SERPL-MCNC: 4.5 MMOL/L — SIGNIFICANT CHANGE UP (ref 3.5–5.3)
POTASSIUM SERPL-SCNC: 4.5 MMOL/L — SIGNIFICANT CHANGE UP (ref 3.5–5.3)
PROT SERPL-MCNC: 7.5 G/DL — SIGNIFICANT CHANGE UP (ref 6–8.3)
PROTHROM AB SERPL-ACNC: 12.4 SEC — SIGNIFICANT CHANGE UP (ref 10.5–13.4)
RBC # BLD: 4.99 M/UL — SIGNIFICANT CHANGE UP (ref 4.2–5.8)
RBC # FLD: 13.8 % — SIGNIFICANT CHANGE UP (ref 10.3–14.5)
SODIUM SERPL-SCNC: 139 MMOL/L — SIGNIFICANT CHANGE UP (ref 135–145)
WBC # BLD: 5.69 K/UL — SIGNIFICANT CHANGE UP (ref 3.8–10.5)
WBC # FLD AUTO: 5.69 K/UL — SIGNIFICANT CHANGE UP (ref 3.8–10.5)

## 2022-08-15 PROCEDURE — 36415 COLL VENOUS BLD VENIPUNCTURE: CPT

## 2022-08-15 PROCEDURE — 85730 THROMBOPLASTIN TIME PARTIAL: CPT

## 2022-08-15 PROCEDURE — 85025 COMPLETE CBC W/AUTO DIFF WBC: CPT

## 2022-08-15 PROCEDURE — 86900 BLOOD TYPING SEROLOGIC ABO: CPT

## 2022-08-15 PROCEDURE — 86901 BLOOD TYPING SEROLOGIC RH(D): CPT

## 2022-08-15 PROCEDURE — 85610 PROTHROMBIN TIME: CPT

## 2022-08-15 PROCEDURE — 86850 RBC ANTIBODY SCREEN: CPT

## 2022-08-15 PROCEDURE — 80053 COMPREHEN METABOLIC PANEL: CPT

## 2022-08-16 ENCOUNTER — NON-APPOINTMENT (OUTPATIENT)
Age: 32
End: 2022-08-16

## 2022-08-16 VITALS
HEART RATE: 62 BPM | OXYGEN SATURATION: 100 % | SYSTOLIC BLOOD PRESSURE: 130 MMHG | HEIGHT: 73.5 IN | RESPIRATION RATE: 16 BRPM | DIASTOLIC BLOOD PRESSURE: 60 MMHG | TEMPERATURE: 98 F | WEIGHT: 229.94 LBS

## 2022-08-16 RX ORDER — APIXABAN 2.5 MG/1
1 TABLET, FILM COATED ORAL
Qty: 0 | Refills: 0 | DISCHARGE

## 2022-08-16 NOTE — ASU PATIENT PROFILE, ADULT - FALL HARM RISK - UNIVERSAL INTERVENTIONS
Bed in lowest position, wheels locked, appropriate side rails in place/Call bell, personal items and telephone in reach/Instruct patient to call for assistance before getting out of bed or chair/Non-slip footwear when patient is out of bed/[place] to call system/Physically safe environment - no spills, clutter or unnecessary equipment/Purposeful Proactive Rounding/Room/bathroom lighting operational, light cord in reach

## 2022-08-16 NOTE — ASU PATIENT PROFILE, ADULT - NSICDXPASTMEDICALHX_GEN_ALL_CORE_FT
PAST MEDICAL HISTORY:  Anxiety     Cerebellar stroke left hand coordination deficit    PFO (patent foramen ovale)     Transaminitis

## 2022-08-16 NOTE — ASU PATIENT PROFILE, ADULT - HISTORY OF COVID-19 VACCINATION
Left a message for her, asking for a call back - we need to re-draw PTH bloodwork. Can she stop by at her earliest convenience?
Yes

## 2022-08-17 ENCOUNTER — TRANSCRIPTION ENCOUNTER (OUTPATIENT)
Age: 32
End: 2022-08-17

## 2022-08-17 ENCOUNTER — APPOINTMENT (OUTPATIENT)
Dept: CARDIOTHORACIC SURGERY | Facility: HOSPITAL | Age: 32
End: 2022-08-17

## 2022-08-17 ENCOUNTER — INPATIENT (INPATIENT)
Facility: HOSPITAL | Age: 32
LOS: 0 days | Discharge: HOME | DRG: 274 | End: 2022-08-18
Attending: INTERNAL MEDICINE | Admitting: INTERNAL MEDICINE
Payer: MEDICAID

## 2022-08-17 LAB
ALBUMIN SERPL ELPH-MCNC: 3.8 G/DL — SIGNIFICANT CHANGE UP (ref 3.3–5)
ALBUMIN SERPL ELPH-MCNC: 4.5 G/DL — SIGNIFICANT CHANGE UP (ref 3.3–5)
ALP SERPL-CCNC: 73 U/L — SIGNIFICANT CHANGE UP (ref 40–120)
ALP SERPL-CCNC: 81 U/L — SIGNIFICANT CHANGE UP (ref 40–120)
ALT FLD-CCNC: 41 U/L — SIGNIFICANT CHANGE UP (ref 10–45)
ALT FLD-CCNC: 51 U/L — HIGH (ref 10–45)
ANION GAP SERPL CALC-SCNC: 11 MMOL/L — SIGNIFICANT CHANGE UP (ref 5–17)
ANION GAP SERPL CALC-SCNC: 9 MMOL/L — SIGNIFICANT CHANGE UP (ref 5–17)
APTT BLD: 25.3 SEC — LOW (ref 27.5–35.5)
APTT BLD: 30.1 SEC — SIGNIFICANT CHANGE UP (ref 27.5–35.5)
AST SERPL-CCNC: 40 U/L — SIGNIFICANT CHANGE UP (ref 10–40)
AST SERPL-CCNC: 49 U/L — HIGH (ref 10–40)
BASOPHILS # BLD AUTO: 0.03 K/UL — SIGNIFICANT CHANGE UP (ref 0–0.2)
BASOPHILS NFR BLD AUTO: 0.4 % — SIGNIFICANT CHANGE UP (ref 0–2)
BILIRUB SERPL-MCNC: 0.4 MG/DL — SIGNIFICANT CHANGE UP (ref 0.2–1.2)
BILIRUB SERPL-MCNC: 0.4 MG/DL — SIGNIFICANT CHANGE UP (ref 0.2–1.2)
BLD GP AB SCN SERPL QL: NEGATIVE — SIGNIFICANT CHANGE UP
BUN SERPL-MCNC: 14 MG/DL — SIGNIFICANT CHANGE UP (ref 7–23)
BUN SERPL-MCNC: 18 MG/DL — SIGNIFICANT CHANGE UP (ref 7–23)
CALCIUM SERPL-MCNC: 9.2 MG/DL — SIGNIFICANT CHANGE UP (ref 8.4–10.5)
CALCIUM SERPL-MCNC: 9.9 MG/DL — SIGNIFICANT CHANGE UP (ref 8.4–10.5)
CHLORIDE SERPL-SCNC: 105 MMOL/L — SIGNIFICANT CHANGE UP (ref 96–108)
CHLORIDE SERPL-SCNC: 107 MMOL/L — SIGNIFICANT CHANGE UP (ref 96–108)
CO2 SERPL-SCNC: 25 MMOL/L — SIGNIFICANT CHANGE UP (ref 22–31)
CO2 SERPL-SCNC: 25 MMOL/L — SIGNIFICANT CHANGE UP (ref 22–31)
CREAT SERPL-MCNC: 1.15 MG/DL — SIGNIFICANT CHANGE UP (ref 0.5–1.3)
CREAT SERPL-MCNC: 1.18 MG/DL — SIGNIFICANT CHANGE UP (ref 0.5–1.3)
EGFR: 84 ML/MIN/1.73M2 — SIGNIFICANT CHANGE UP
EGFR: 86 ML/MIN/1.73M2 — SIGNIFICANT CHANGE UP
EOSINOPHIL # BLD AUTO: 0.2 K/UL — SIGNIFICANT CHANGE UP (ref 0–0.5)
EOSINOPHIL NFR BLD AUTO: 2.8 % — SIGNIFICANT CHANGE UP (ref 0–6)
GLUCOSE BLDC GLUCOMTR-MCNC: 84 MG/DL — SIGNIFICANT CHANGE UP (ref 70–99)
GLUCOSE SERPL-MCNC: 84 MG/DL — SIGNIFICANT CHANGE UP (ref 70–99)
GLUCOSE SERPL-MCNC: 91 MG/DL — SIGNIFICANT CHANGE UP (ref 70–99)
HCT VFR BLD CALC: 38.3 % — LOW (ref 39–50)
HCT VFR BLD CALC: 43.1 % — SIGNIFICANT CHANGE UP (ref 39–50)
HGB BLD-MCNC: 12.8 G/DL — LOW (ref 13–17)
HGB BLD-MCNC: 14.2 G/DL — SIGNIFICANT CHANGE UP (ref 13–17)
IMM GRANULOCYTES NFR BLD AUTO: 0.3 % — SIGNIFICANT CHANGE UP (ref 0–1.5)
INR BLD: 1.07 — SIGNIFICANT CHANGE UP (ref 0.88–1.16)
INR BLD: 1.23 — HIGH (ref 0.88–1.16)
LYMPHOCYTES # BLD AUTO: 1.19 K/UL — SIGNIFICANT CHANGE UP (ref 1–3.3)
LYMPHOCYTES # BLD AUTO: 16.4 % — SIGNIFICANT CHANGE UP (ref 13–44)
MAGNESIUM SERPL-MCNC: 1.9 MG/DL — SIGNIFICANT CHANGE UP (ref 1.6–2.6)
MCHC RBC-ENTMCNC: 27.4 PG — SIGNIFICANT CHANGE UP (ref 27–34)
MCHC RBC-ENTMCNC: 27.9 PG — SIGNIFICANT CHANGE UP (ref 27–34)
MCHC RBC-ENTMCNC: 32.9 GM/DL — SIGNIFICANT CHANGE UP (ref 32–36)
MCHC RBC-ENTMCNC: 33.4 GM/DL — SIGNIFICANT CHANGE UP (ref 32–36)
MCV RBC AUTO: 83 FL — SIGNIFICANT CHANGE UP (ref 80–100)
MCV RBC AUTO: 83.6 FL — SIGNIFICANT CHANGE UP (ref 80–100)
MONOCYTES # BLD AUTO: 0.3 K/UL — SIGNIFICANT CHANGE UP (ref 0–0.9)
MONOCYTES NFR BLD AUTO: 4.1 % — SIGNIFICANT CHANGE UP (ref 2–14)
NEUTROPHILS # BLD AUTO: 5.51 K/UL — SIGNIFICANT CHANGE UP (ref 1.8–7.4)
NEUTROPHILS NFR BLD AUTO: 76 % — SIGNIFICANT CHANGE UP (ref 43–77)
NRBC # BLD: 0 /100 WBCS — SIGNIFICANT CHANGE UP (ref 0–0)
NRBC # BLD: 0 /100 WBCS — SIGNIFICANT CHANGE UP (ref 0–0)
NT-PROBNP SERPL-SCNC: 57 PG/ML — SIGNIFICANT CHANGE UP (ref 0–300)
PHOSPHATE SERPL-MCNC: 3.1 MG/DL — SIGNIFICANT CHANGE UP (ref 2.5–4.5)
PLATELET # BLD AUTO: 159 K/UL — SIGNIFICANT CHANGE UP (ref 150–400)
PLATELET # BLD AUTO: 196 K/UL — SIGNIFICANT CHANGE UP (ref 150–400)
POTASSIUM SERPL-MCNC: 4 MMOL/L — SIGNIFICANT CHANGE UP (ref 3.5–5.3)
POTASSIUM SERPL-MCNC: 4.4 MMOL/L — SIGNIFICANT CHANGE UP (ref 3.5–5.3)
POTASSIUM SERPL-SCNC: 4 MMOL/L — SIGNIFICANT CHANGE UP (ref 3.5–5.3)
POTASSIUM SERPL-SCNC: 4.4 MMOL/L — SIGNIFICANT CHANGE UP (ref 3.5–5.3)
PROT SERPL-MCNC: 6.5 G/DL — SIGNIFICANT CHANGE UP (ref 6–8.3)
PROT SERPL-MCNC: 7.6 G/DL — SIGNIFICANT CHANGE UP (ref 6–8.3)
PROTHROM AB SERPL-ACNC: 12.8 SEC — SIGNIFICANT CHANGE UP (ref 10.5–13.4)
PROTHROM AB SERPL-ACNC: 14.7 SEC — HIGH (ref 10.5–13.4)
RBC # BLD: 4.58 M/UL — SIGNIFICANT CHANGE UP (ref 4.2–5.8)
RBC # BLD: 5.19 M/UL — SIGNIFICANT CHANGE UP (ref 4.2–5.8)
RBC # FLD: 13.4 % — SIGNIFICANT CHANGE UP (ref 10.3–14.5)
RBC # FLD: 13.6 % — SIGNIFICANT CHANGE UP (ref 10.3–14.5)
RH IG SCN BLD-IMP: POSITIVE — SIGNIFICANT CHANGE UP
SODIUM SERPL-SCNC: 141 MMOL/L — SIGNIFICANT CHANGE UP (ref 135–145)
SODIUM SERPL-SCNC: 141 MMOL/L — SIGNIFICANT CHANGE UP (ref 135–145)
WBC # BLD: 5.3 K/UL — SIGNIFICANT CHANGE UP (ref 3.8–10.5)
WBC # BLD: 7.25 K/UL — SIGNIFICANT CHANGE UP (ref 3.8–10.5)
WBC # FLD AUTO: 5.3 K/UL — SIGNIFICANT CHANGE UP (ref 3.8–10.5)
WBC # FLD AUTO: 7.25 K/UL — SIGNIFICANT CHANGE UP (ref 3.8–10.5)

## 2022-08-17 PROCEDURE — 93580 TRANSCATH CLOSURE OF ASD: CPT

## 2022-08-17 PROCEDURE — 93662 INTRACARDIAC ECG (ICE): CPT | Mod: 26

## 2022-08-17 PROCEDURE — 71045 X-RAY EXAM CHEST 1 VIEW: CPT | Mod: 26

## 2022-08-17 DEVICE — IMPLANTABLE DEVICE: Type: IMPLANTABLE DEVICE | Status: FUNCTIONAL

## 2022-08-17 DEVICE — SYS DEL TALISMAN 9F: Type: IMPLANTABLE DEVICE | Status: FUNCTIONAL

## 2022-08-17 DEVICE — SHEATH INTRODUCER TERUMO PINNACLE CORONARY 8FR X 10CM X 0.038" MINI WIRE: Type: IMPLANTABLE DEVICE | Status: FUNCTIONAL

## 2022-08-17 DEVICE — MICRO-INTRO 5F 0.018X40CM NITINOL SS TIP 7CM ECHOGENIC: Type: IMPLANTABLE DEVICE | Status: FUNCTIONAL

## 2022-08-17 DEVICE — SHEATH INTRODUCER TERUMO PINNACLE CORONARY 9FR X 10CM X 0.038" MINI WIRE: Type: IMPLANTABLE DEVICE | Status: FUNCTIONAL

## 2022-08-17 DEVICE — OCCLUDER PFO TALISMAN 30MM: Type: IMPLANTABLE DEVICE | Status: FUNCTIONAL

## 2022-08-17 DEVICE — CATH DX MPA2 INFIN 5FRX100CM: Type: IMPLANTABLE DEVICE | Status: FUNCTIONAL

## 2022-08-17 DEVICE — GWIRE AMPLATZER .035 X 260CM: Type: IMPLANTABLE DEVICE | Status: FUNCTIONAL

## 2022-08-17 DEVICE — CATH ACUNAV 8FX90CM: Type: IMPLANTABLE DEVICE | Status: FUNCTIONAL

## 2022-08-17 DEVICE — SYS VASCADE MVP VASCULAR CLOSURE 6-12F: Type: IMPLANTABLE DEVICE | Status: FUNCTIONAL

## 2022-08-17 DEVICE — SHEATH INTRODUCER TERUMO PINNACLE CORONARY 9FR X 25CM: Type: IMPLANTABLE DEVICE | Status: FUNCTIONAL

## 2022-08-17 DEVICE — GWIRE GUID  0.035INX150CM: Type: IMPLANTABLE DEVICE | Status: FUNCTIONAL

## 2022-08-17 DEVICE — KIT VERSACROSS PIGTAIL .035IN 45 DEG D1 230CM: Type: IMPLANTABLE DEVICE | Status: FUNCTIONAL

## 2022-08-17 RX ORDER — INSULIN LISPRO 100/ML
VIAL (ML) SUBCUTANEOUS
Refills: 0 | Status: DISCONTINUED | OUTPATIENT
Start: 2022-08-17 | End: 2022-08-18

## 2022-08-17 RX ORDER — ASPIRIN/CALCIUM CARB/MAGNESIUM 324 MG
81 TABLET ORAL DAILY
Refills: 0 | Status: DISCONTINUED | OUTPATIENT
Start: 2022-08-18 | End: 2022-08-18

## 2022-08-17 RX ORDER — GLUCAGON INJECTION, SOLUTION 0.5 MG/.1ML
1 INJECTION, SOLUTION SUBCUTANEOUS ONCE
Refills: 0 | Status: DISCONTINUED | OUTPATIENT
Start: 2022-08-17 | End: 2022-08-18

## 2022-08-17 RX ORDER — ASPIRIN/CALCIUM CARB/MAGNESIUM 324 MG
81 TABLET ORAL ONCE
Refills: 0 | Status: COMPLETED | OUTPATIENT
Start: 2022-08-17 | End: 2022-08-17

## 2022-08-17 RX ORDER — HEPARIN SODIUM 5000 [USP'U]/ML
5000 INJECTION INTRAVENOUS; SUBCUTANEOUS EVERY 8 HOURS
Refills: 0 | Status: DISCONTINUED | OUTPATIENT
Start: 2022-08-17 | End: 2022-08-18

## 2022-08-17 RX ORDER — DEXTROSE 50 % IN WATER 50 %
25 SYRINGE (ML) INTRAVENOUS ONCE
Refills: 0 | Status: DISCONTINUED | OUTPATIENT
Start: 2022-08-17 | End: 2022-08-18

## 2022-08-17 RX ORDER — SODIUM CHLORIDE 9 MG/ML
1000 INJECTION, SOLUTION INTRAVENOUS
Refills: 0 | Status: DISCONTINUED | OUTPATIENT
Start: 2022-08-17 | End: 2022-08-18

## 2022-08-17 RX ORDER — SODIUM CHLORIDE 9 MG/ML
1000 INJECTION INTRAMUSCULAR; INTRAVENOUS; SUBCUTANEOUS
Refills: 0 | Status: DISCONTINUED | OUTPATIENT
Start: 2022-08-17 | End: 2022-08-18

## 2022-08-17 RX ORDER — CEFAZOLIN SODIUM 1 G
2000 VIAL (EA) INJECTION EVERY 8 HOURS
Refills: 0 | Status: DISCONTINUED | OUTPATIENT
Start: 2022-08-17 | End: 2022-08-18

## 2022-08-17 RX ORDER — DEXTROSE 50 % IN WATER 50 %
15 SYRINGE (ML) INTRAVENOUS ONCE
Refills: 0 | Status: DISCONTINUED | OUTPATIENT
Start: 2022-08-17 | End: 2022-08-18

## 2022-08-17 RX ORDER — PANTOPRAZOLE SODIUM 20 MG/1
40 TABLET, DELAYED RELEASE ORAL DAILY
Refills: 0 | Status: DISCONTINUED | OUTPATIENT
Start: 2022-08-17 | End: 2022-08-18

## 2022-08-17 RX ORDER — DEXTROSE 50 % IN WATER 50 %
12.5 SYRINGE (ML) INTRAVENOUS ONCE
Refills: 0 | Status: DISCONTINUED | OUTPATIENT
Start: 2022-08-17 | End: 2022-08-18

## 2022-08-17 RX ORDER — BENZOCAINE AND MENTHOL 5; 1 G/100ML; G/100ML
1 LIQUID ORAL EVERY 6 HOURS
Refills: 0 | Status: DISCONTINUED | OUTPATIENT
Start: 2022-08-17 | End: 2022-08-18

## 2022-08-17 RX ADMIN — HEPARIN SODIUM 5000 UNIT(S): 5000 INJECTION INTRAVENOUS; SUBCUTANEOUS at 22:19

## 2022-08-17 RX ADMIN — Medication 81 MILLIGRAM(S): at 20:54

## 2022-08-17 NOTE — H&P ADULT - NSHPREVIEWOFSYSTEMS_GEN_ALL_CORE
CONSTITUTIONAL: no Fevers / chills, sweats, fatigue, weight loss, weight gain                                     NEURO: denies parathesias, seizures, syncope, confusion                                                                                 EYES: denies  Blurry vision, discharge, pain, loss of vision                                                                                   ENMT: denies Difficulty hearing, vertigo, dysphagia, epistaxis, recent dental work                                      CV: denies Chest pain, palpitations, REYNA, orthopnea                                                                                          RESPIRATORY: denies Wheezing, SOB, cough / sputum, hemoptysis                                                               GI: denies Nausea, vomiting, diarrhea, constipation, melena                                                                       : denies Hematuria, dysuria, urgency, incontinence                                                                                        MUSKULOSKELETAL: denies arthritis, joint swelling, muscle weakness                                                             SKIN/BREAST: denies rash, itching, anne loss, masses                                                                                              PSYCH: denies depression, anxiety, suicidal ideation                                                                                               HEME/LYMPH: denies bruises easily, enlarged lymph nodes, tender lymph nodes                                          ENDOCRINE: denies cold intolerance, heat intolerance, polydipsia

## 2022-08-17 NOTE — BRIEF OPERATIVE NOTE - COMMENTS
Dr. Barry was the first assistant for this case including but not limited to transcatheter PFO closure     I was present for this procedure and participated as first assistant as described by the PA above, unless otherwise noted below.

## 2022-08-17 NOTE — BRIEF OPERATIVE NOTE - NSICDXBRIEFPROCEDURE_GEN_ALL_CORE_FT
PROCEDURES:  Transcatheter closure of patent foramen ovale (PFO) 17-Aug-2022 17:49:10  Landon Metzger

## 2022-08-17 NOTE — H&P ADULT - ASSESSMENT
30 y/o M, , with no significant PMHx who presented to [facility] 7 days ago after experiencing an episode of tinnitus, dizziness, and vomiting when awaking from sleep. Patient's wife called EMS who brought patient to [facility]. While there, patient underwent CT head in ED which was negative. However, subsequent CTA H/N revealed acute infarct in left superior cerebellar artery territory. Pt was admitted to ICU for closer monitoring. MRI confirmed acute infarct involving the left superior cerebellar artery territory. Pt also treated for vertigo symptoms. Further w/u revealed +PFO confirmed by ECHO with bubble, EF 60-65%. Transitioned to tele floor. Neurology following patient and recommended JAROD which was positive for PFO, no thrombus. Of note, pt developed transaminitis after starting atorvastatin for CVA ppx. Pt admits to taking multiple OTC supplements including amio acids, protein, and creatinine. LFTs downtrended after stopping atorvastatin. Given +PFO with CVA, pt transferred to [facility] for further evaluation for possible intervention. On arrival, pt states he still feels he has residual left sided coordination both with holding objects and with walking but normal strength. Otherwise, pt feels well and denies any CP, palpitations, SOB, wheezing, abd pain, n/v/d/c, fevers or chills.       Neurovascular: CVA  - neurologic check per routine  - PFO closure     Cardiovascular:   PFO  - pre op ASA 81mg and continue 162mg post op       Respiratory: 02 Sat = 98% on RA.  -If on oxygen wean to RA from for O2 Sat > 93%.  -Encourage C+DB and Use of IS 10x / hr while awake.  -CXR.    GI:   - GI PPX    Renal / : stable   - gentle hydration post procedure  -monitor lab an output     Endocrine:  stale     Hematologic: H/H stable   - type and screen and hold blood for procedure   - daily lab     ID: afebrile   - pre op and post op abx continues   - daily lab .    Prophylaxis:  - heparin   - SCDs and budQ hep post op     Disposition:  - intervention today  - 9LA fo rmonitor

## 2022-08-17 NOTE — H&P ADULT - HISTORY OF PRESENT ILLNESS
Surgeon:    Requesting Physician:    HISTORY OF PRESENT ILLNESS:  32y Male    PAST MEDICAL & SURGICAL HISTORY:  Cerebellar stroke  left hand coordination deficit      PFO (patent foramen ovale)      Transaminitis      Anxiety      No significant past surgical history          MEDICATIONS  (STANDING):    MEDICATIONS  (PRN):      Allergies    Kiwi (Unknown)  No Known Drug Allergies  peanuts (Unknown)    Intolerances        SOCIAL HISTORY:  Smoker:  YES / NO        PACK YEARS:                         WHEN QUIT?  ETOH use:  YES / NO               FREQUENCY / QUANTITY:  Ilicit Drug use:  YES / NO  Occupation:  Assisted device use (Cane / Walker):  Live with:    FAMILY HISTORY:  No pertinent family history in first degree relatives        Review of Systems  CONSTITUTIONAL:  Fevers / chills, sweats, fatigue, weight loss, weight gain                                    NEGATIVE  NEURO:  parathesias, seizures, syncope, confusion                                                                               NEGATIVE  EYES:  Blurry vision, discharge, pain, loss of vision                                                                                  NEGATIVE  ENMT:  Difficulty hearing, vertigo, dysphagia, epistaxis, recent dental work                                     NEGATIVE  CV:  Chest pain, palpitations, REYNA, orthopnea                                                                                         NEGATIVE  RESPIRATORY:  Wheezing, SOB, cough / sputum, hemoptysis                                                              NEGATIVE  GI:  Nausea, vommiting, diarrhea, constipation, melena                                                                        NEGATIVE  : Hematuria, dysuria, urgency, incontinence                                                                                       NEGATIVE  MUSKULOSKELETAL:  arthritis, joint swelling, muscle weakness                                                           NEGATIVE  SKIN/BREAST:  rash, itching, anne loss, masses                                                                                            NEGATIVE  PSYCH:  depresion, anxiety, suicidal ideation                                                                                             NEGATIVE  HEME/LYMPH:  bruises easily, enlarged lymph nodes, tender lymph nodes                                        NEGATIVE  ENDOCRINE:  cold intolerance, heat intolerance, polydipsia                                                                   NEGATIVE    PHYSICAL EXAM  Vital Signs Last 24 Hrs  T(C): --  T(F): --  HR: --  BP: --  BP(mean): --  RR: --  SpO2: --        CONSTITUTIONAL:                                                                          WNL  NEURO:                                                                                             WNL                      EYES:                                                                                                  WNL  ENMT:                                                                                                WNL  CV:                                                                                                      WNL  RESPIRATORY:                                                                                  WNL  GI:                                                                                                       WNL  : HAMILTON + / -                                                                                 WNL  MUSKULOSKELETAL:                                                                       WNL  SKIN / BREAST:                                                                                 WNL                                                          LABS:                        14.2   5.30  )-----------( 196      ( 17 Aug 2022 13:10 )             43.1     08-17    141  |  105  |  18  ----------------------------<  84  4.0   |  25  |  1.18    Ca    9.9      17 Aug 2022 13:10  Phos  3.1     08-17  Mg     1.9     08-17    TPro  7.6  /  Alb  4.5  /  TBili  0.4  /  DBili  x   /  AST  49<H>  /  ALT  51<H>  /  AlkPhos  81  08-17    PT/INR - ( 17 Aug 2022 13:10 )   PT: 12.8 sec;   INR: 1.07          PTT - ( 17 Aug 2022 13:10 )  PTT:25.3 sec            RADIOLOGY & ADDITIONAL STUDIES:  CAROTID U/S:    CXR:    CT Scan:    EKG:    TTE / JAROD:    Cardiac Cath: 31 y/o M, , with recent finding of medical history or acute infarct in left superior cerebella artery territory and PFOs presented to this admission for surgical intervention.  Patient initially presented to [facility] in April after experiencing an episode of tinnitus, dizziness, and vomiting when awaking from sleep. Patient's wife called EMS who brought patient to [facility]. While there, patient underwent CT head in ED which was negative. However, subsequent CTA H/N revealed acute infarct in left superior cerebellar artery territory. Pt was admitted to ICU for closer monitoring. MRI confirmed acute infarct involving the left superior cerebellar artery territory. Pt also treated for vertigo symptoms. Further w/u revealed +PFO confirmed by ECHO with bubble, EF 60-65%. Transitioned to tele floor. Neurology following patient and recommended JAROD which was positive for PFO, no thrombus. Of note, pt developed transaminitis after starting atorvastatin for CVA ppx. Pt admits to taking multiple OTC supplements including amio acids, protein, and creatinine. LFTs downtrended after stopping atorvastatin. He was then evaluated by Dr. Delgado and was thought to be a good candidate for intervention.  Upon arrival, patient is hemodynamically stable who is not in acute distress.  He is afebrile and denies chest pain, shortness of breath, syncopal episodes, or dizziness.  No loss of sensation was appreciated.

## 2022-08-17 NOTE — H&P ADULT - NSHPSOCIALHISTORY_GEN_ALL_CORE
Smoker: non contributory   ETOH use: non contributory  illicit drug: contributory   Occupation:    Assisted device use (Cane / Walker): n/a  Live with: wife and family

## 2022-08-17 NOTE — H&P ADULT - NSHPPHYSICALEXAM_GEN_ALL_CORE
T(C): --  T(F): 98F  HR: 56  BP:   BP(mean): --  RR: 16  SpO2: 98%    CONSTITUTIONAL:     NAD                                                                       NEURO:           grossly intact                                                                                                      EYES:     PERRL                                                                                               ENMT:   supple and non lymphadenopathy                                                                                               CV:         RRR without murmur                                                                                               RESPIRATORY:                  no wheezing and non rhonchi                                                                  GI:       BM And BS are intact                                                                                                  : deferred                                                                                MUSKULOSKELETAL:   no deformity                                                                      SKIN / BREAST:  warm

## 2022-08-17 NOTE — H&P ADULT - NSHPLABSRESULTS_GEN_ALL_CORE
LABS:                        14.2   5.30  )-----------( 196      ( 17 Aug 2022 13:10 )             43.1     08-17    141  |  105  |  18  ----------------------------<  84  4.0   |  25  |  1.18    Ca    9.9      17 Aug 2022 13:10  Phos  3.1     08-17  Mg     1.9     08-17    TPro  7.6  /  Alb  4.5  /  TBili  0.4  /  DBili  x   /  AST  49<H>  /  ALT  51<H>  /  AlkPhos  81  08-17    PT/INR - ( 17 Aug 2022 13:10 )   PT: 12.8 sec;   INR: 1.07     PTT - ( 17 Aug 2022 13:10 )  PTT:25.3 sec      RADIOLOGY & ADDITIONAL STUDIES:  CAROTID U/S:    CXR:    CT Scan:    EKG:    TTE / JAROD:    Cardiac Cath:

## 2022-08-18 ENCOUNTER — TRANSCRIPTION ENCOUNTER (OUTPATIENT)
Age: 32
End: 2022-08-18

## 2022-08-18 VITALS
DIASTOLIC BLOOD PRESSURE: 68 MMHG | OXYGEN SATURATION: 97 % | SYSTOLIC BLOOD PRESSURE: 122 MMHG | RESPIRATION RATE: 18 BRPM

## 2022-08-18 PROBLEM — F41.9 ANXIETY DISORDER, UNSPECIFIED: Chronic | Status: ACTIVE | Noted: 2022-08-16

## 2022-08-18 PROBLEM — I63.9 CEREBRAL INFARCTION, UNSPECIFIED: Chronic | Status: ACTIVE | Noted: 2022-08-16

## 2022-08-18 PROBLEM — R74.01 ELEVATION OF LEVELS OF LIVER TRANSAMINASE LEVELS: Chronic | Status: ACTIVE | Noted: 2022-08-16

## 2022-08-18 PROBLEM — Q21.1 ATRIAL SEPTAL DEFECT: Chronic | Status: ACTIVE | Noted: 2022-08-16

## 2022-08-18 LAB
A1C WITH ESTIMATED AVERAGE GLUCOSE RESULT: 4.7 % — SIGNIFICANT CHANGE UP (ref 4–5.6)
ALBUMIN SERPL ELPH-MCNC: 4.2 G/DL — SIGNIFICANT CHANGE UP (ref 3.3–5)
ALP SERPL-CCNC: 80 U/L — SIGNIFICANT CHANGE UP (ref 40–120)
ALT FLD-CCNC: 44 U/L — SIGNIFICANT CHANGE UP (ref 10–45)
ANION GAP SERPL CALC-SCNC: 9 MMOL/L — SIGNIFICANT CHANGE UP (ref 5–17)
APTT BLD: 28 SEC — SIGNIFICANT CHANGE UP (ref 27.5–35.5)
AST SERPL-CCNC: 39 U/L — SIGNIFICANT CHANGE UP (ref 10–40)
BASOPHILS # BLD AUTO: 0.02 K/UL — SIGNIFICANT CHANGE UP (ref 0–0.2)
BASOPHILS NFR BLD AUTO: 0.3 % — SIGNIFICANT CHANGE UP (ref 0–2)
BILIRUB SERPL-MCNC: 0.5 MG/DL — SIGNIFICANT CHANGE UP (ref 0.2–1.2)
BUN SERPL-MCNC: 16 MG/DL — SIGNIFICANT CHANGE UP (ref 7–23)
CALCIUM SERPL-MCNC: 9.3 MG/DL — SIGNIFICANT CHANGE UP (ref 8.4–10.5)
CHLORIDE SERPL-SCNC: 103 MMOL/L — SIGNIFICANT CHANGE UP (ref 96–108)
CO2 SERPL-SCNC: 24 MMOL/L — SIGNIFICANT CHANGE UP (ref 22–31)
CREAT SERPL-MCNC: 1.08 MG/DL — SIGNIFICANT CHANGE UP (ref 0.5–1.3)
EGFR: 93 ML/MIN/1.73M2 — SIGNIFICANT CHANGE UP
EOSINOPHIL # BLD AUTO: 0.01 K/UL — SIGNIFICANT CHANGE UP (ref 0–0.5)
EOSINOPHIL NFR BLD AUTO: 0.1 % — SIGNIFICANT CHANGE UP (ref 0–6)
ESTIMATED AVERAGE GLUCOSE: 88 MG/DL — SIGNIFICANT CHANGE UP (ref 68–114)
GLUCOSE BLDC GLUCOMTR-MCNC: 98 MG/DL — SIGNIFICANT CHANGE UP (ref 70–99)
GLUCOSE SERPL-MCNC: 83 MG/DL — SIGNIFICANT CHANGE UP (ref 70–99)
HCT VFR BLD CALC: 41.4 % — SIGNIFICANT CHANGE UP (ref 39–50)
HGB BLD-MCNC: 13.5 G/DL — SIGNIFICANT CHANGE UP (ref 13–17)
IMM GRANULOCYTES NFR BLD AUTO: 0.1 % — SIGNIFICANT CHANGE UP (ref 0–1.5)
INR BLD: 1.23 — HIGH (ref 0.88–1.16)
ISTAT ACTK (ACTIVATED CLOTTING TIME KAOLIN): 219 SEC — HIGH (ref 74–137)
ISTAT ACTK (ACTIVATED CLOTTING TIME KAOLIN): 231 SEC — HIGH (ref 74–137)
ISTAT ACTK (ACTIVATED CLOTTING TIME KAOLIN): 231 SEC — HIGH (ref 74–137)
LYMPHOCYTES # BLD AUTO: 1.19 K/UL — SIGNIFICANT CHANGE UP (ref 1–3.3)
LYMPHOCYTES # BLD AUTO: 16.7 % — SIGNIFICANT CHANGE UP (ref 13–44)
MCHC RBC-ENTMCNC: 27.7 PG — SIGNIFICANT CHANGE UP (ref 27–34)
MCHC RBC-ENTMCNC: 32.6 GM/DL — SIGNIFICANT CHANGE UP (ref 32–36)
MCV RBC AUTO: 85 FL — SIGNIFICANT CHANGE UP (ref 80–100)
MONOCYTES # BLD AUTO: 0.68 K/UL — SIGNIFICANT CHANGE UP (ref 0–0.9)
MONOCYTES NFR BLD AUTO: 9.6 % — SIGNIFICANT CHANGE UP (ref 2–14)
NEUTROPHILS # BLD AUTO: 5.21 K/UL — SIGNIFICANT CHANGE UP (ref 1.8–7.4)
NEUTROPHILS NFR BLD AUTO: 73.2 % — SIGNIFICANT CHANGE UP (ref 43–77)
NRBC # BLD: 0 /100 WBCS — SIGNIFICANT CHANGE UP (ref 0–0)
PLATELET # BLD AUTO: 168 K/UL — SIGNIFICANT CHANGE UP (ref 150–400)
POTASSIUM SERPL-MCNC: 4.4 MMOL/L — SIGNIFICANT CHANGE UP (ref 3.5–5.3)
POTASSIUM SERPL-SCNC: 4.4 MMOL/L — SIGNIFICANT CHANGE UP (ref 3.5–5.3)
PROT SERPL-MCNC: 6.9 G/DL — SIGNIFICANT CHANGE UP (ref 6–8.3)
PROTHROM AB SERPL-ACNC: 14.7 SEC — HIGH (ref 10.5–13.4)
RBC # BLD: 4.87 M/UL — SIGNIFICANT CHANGE UP (ref 4.2–5.8)
RBC # FLD: 13.4 % — SIGNIFICANT CHANGE UP (ref 10.3–14.5)
SODIUM SERPL-SCNC: 136 MMOL/L — SIGNIFICANT CHANGE UP (ref 135–145)
TSH SERPL-MCNC: 0.56 UIU/ML — SIGNIFICANT CHANGE UP (ref 0.27–4.2)
WBC # BLD: 7.12 K/UL — SIGNIFICANT CHANGE UP (ref 3.8–10.5)
WBC # FLD AUTO: 7.12 K/UL — SIGNIFICANT CHANGE UP (ref 3.8–10.5)

## 2022-08-18 PROCEDURE — C1894: CPT

## 2022-08-18 PROCEDURE — 82962 GLUCOSE BLOOD TEST: CPT

## 2022-08-18 PROCEDURE — 83880 ASSAY OF NATRIURETIC PEPTIDE: CPT

## 2022-08-18 PROCEDURE — C1889: CPT

## 2022-08-18 PROCEDURE — 85347 COAGULATION TIME ACTIVATED: CPT

## 2022-08-18 PROCEDURE — C1817: CPT

## 2022-08-18 PROCEDURE — 83036 HEMOGLOBIN GLYCOSYLATED A1C: CPT

## 2022-08-18 PROCEDURE — 85025 COMPLETE CBC W/AUTO DIFF WBC: CPT

## 2022-08-18 PROCEDURE — C1760: CPT

## 2022-08-18 PROCEDURE — 80053 COMPREHEN METABOLIC PANEL: CPT

## 2022-08-18 PROCEDURE — 86900 BLOOD TYPING SEROLOGIC ABO: CPT

## 2022-08-18 PROCEDURE — 93306 TTE W/DOPPLER COMPLETE: CPT

## 2022-08-18 PROCEDURE — 86850 RBC ANTIBODY SCREEN: CPT

## 2022-08-18 PROCEDURE — C1766: CPT

## 2022-08-18 PROCEDURE — C1769: CPT

## 2022-08-18 PROCEDURE — 83735 ASSAY OF MAGNESIUM: CPT

## 2022-08-18 PROCEDURE — 85610 PROTHROMBIN TIME: CPT

## 2022-08-18 PROCEDURE — 86923 COMPATIBILITY TEST ELECTRIC: CPT

## 2022-08-18 PROCEDURE — 84443 ASSAY THYROID STIM HORMONE: CPT

## 2022-08-18 PROCEDURE — 71045 X-RAY EXAM CHEST 1 VIEW: CPT

## 2022-08-18 PROCEDURE — 86901 BLOOD TYPING SEROLOGIC RH(D): CPT

## 2022-08-18 PROCEDURE — 85730 THROMBOPLASTIN TIME PARTIAL: CPT

## 2022-08-18 PROCEDURE — C1887: CPT

## 2022-08-18 PROCEDURE — 36415 COLL VENOUS BLD VENIPUNCTURE: CPT

## 2022-08-18 PROCEDURE — 84100 ASSAY OF PHOSPHORUS: CPT

## 2022-08-18 PROCEDURE — C1759: CPT

## 2022-08-18 PROCEDURE — 93306 TTE W/DOPPLER COMPLETE: CPT | Mod: 26

## 2022-08-18 PROCEDURE — 85027 COMPLETE CBC AUTOMATED: CPT

## 2022-08-18 RX ORDER — ASPIRIN/CALCIUM CARB/MAGNESIUM 324 MG
325 TABLET ORAL DAILY
Refills: 0 | Status: DISCONTINUED | OUTPATIENT
Start: 2022-08-18 | End: 2022-08-18

## 2022-08-18 RX ORDER — SODIUM CHLORIDE 9 MG/ML
3 INJECTION INTRAMUSCULAR; INTRAVENOUS; SUBCUTANEOUS EVERY 8 HOURS
Refills: 0 | Status: DISCONTINUED | OUTPATIENT
Start: 2022-08-18 | End: 2022-08-18

## 2022-08-18 RX ORDER — ASPIRIN/CALCIUM CARB/MAGNESIUM 324 MG
1 TABLET ORAL
Qty: 30 | Refills: 0
Start: 2022-08-18 | End: 2022-09-16

## 2022-08-18 RX ORDER — PANTOPRAZOLE SODIUM 20 MG/1
1 TABLET, DELAYED RELEASE ORAL
Qty: 30 | Refills: 0
Start: 2022-08-18 | End: 2022-09-16

## 2022-08-18 RX ADMIN — HEPARIN SODIUM 5000 UNIT(S): 5000 INJECTION INTRAVENOUS; SUBCUTANEOUS at 06:28

## 2022-08-18 RX ADMIN — Medication 100 MILLIGRAM(S): at 00:39

## 2022-08-18 RX ADMIN — Medication 325 MILLIGRAM(S): at 11:16

## 2022-08-18 RX ADMIN — PANTOPRAZOLE SODIUM 40 MILLIGRAM(S): 20 TABLET, DELAYED RELEASE ORAL at 11:16

## 2022-08-18 RX ADMIN — Medication 100 MILLIGRAM(S): at 11:15

## 2022-08-18 NOTE — DISCHARGE NOTE PROVIDER - NSDCFUSCHEDAPPT_GEN_ALL_CORE_FT
John Blanco  [facility]  NEUROLOGY [address]  Scheduled Appointment: 10/13/2022

## 2022-08-18 NOTE — DISCHARGE NOTE PROVIDER - NSDCFUADDAPPT_GEN_ALL_CORE_FT
Please follow-up with Dr. Delgado in his office in 1 week. The office should call you with a follow-up appointment time. If you do not receive a call by tomorrow, please call the office.     Please follow-up with Dr. Blanco at scheduled appointment time.

## 2022-08-18 NOTE — DISCHARGE NOTE PROVIDER - NSDCCPTREATMENT_GEN_ALL_CORE_FT
PRINCIPAL PROCEDURE  Procedure: Transcatheter closure of patent foramen ovale (PFO)  Findings and Treatment:

## 2022-08-18 NOTE — PROGRESS NOTE ADULT - SUBJECTIVE AND OBJECTIVE BOX
Patient discussed on morning rounds with Dr. Delgado    Operation / Date: Transcatheter closure of patent foramen ovale / 8/17/22    Surgeon: Dr. Delgado    Referring Physician: Dr. John Blanco     SUBJECTIVE ASSESSMENT AND HOSPITAL COURSE:  31 YO Male, , w/ no known prior PMHx initially presented to [facility] in 4/2022 after experiencing an episode of tinnitus, dizziness, and vomiting when awaking from sleep. 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 Further w/u revealed +PFO confirmed by ECHO with bubble, EF 60-65%. JAROD was positive for PFO w/o thrombus. Of note, pt developed transaminitis after starting atorvastatin for CVA ppx, LFTs downtrended after stopping statin. Pt admits to taking multiple OTC supplements including amino acids, protein, and creatinine. Pt referred to Dr. Delgado for further work-up and intervention. On 8/17/22, patient underwent PFO closure, recovered as mini-ICU on [facility]. POD 1, patient down-graded to tele/step-down, TTE revealed ____. R groin sutures removed, tolerated well by patient. At present, patient admits to some discomfort at the R groin site w/ ambulation, but denies fever, chills, chest pain, SOB, palpitations, N/V. Patient cleared for discharge home by Dr. Delgado.     Vital Signs Last 24 Hrs  T(C): 36.5 (18 Aug 2022 09:11), Max: 36.5 (18 Aug 2022 09:11)  T(F): 97.7 (18 Aug 2022 09:11), Max: 97.7 (18 Aug 2022 09:11)  HR: 70 (18 Aug 2022 08:00) (57 - 71)  BP: 117/61 (18 Aug 2022 08:00) (102/56 - 124/58)  BP(mean): 84 (18 Aug 2022 08:00) (73 - 95)  RR: 18 (18 Aug 2022 07:00) (17 - 20)  SpO2: 95% (18 Aug 2022 08:00) (94% - 99%)    Parameters below as of 18 Aug 2022 08:00  Patient On (Oxygen Delivery Method): room air    EPICARDIAL WIRES REMOVED: YES  TIE DOWNS REMOVED: YES    PHYSICAL EXAM:  GENERAL: NAD, lying in bed comfortably  HEAD:  Atraumatic, Normocephalic  EYES: EOMI, PERRLA, conjunctiva and sclera clear  ENT: Moist mucous membranes  CHEST/LUNG: Clear to auscultation bilaterally; No rales, rhonchi, wheezing, or rubs. Unlabored respirations  HEART: Regular rate and rhythm; No murmurs, rubs, or gallops  ABDOMEN: Bowel sounds present; Soft, Nontender, Nondistended. No hepatomegally  GROIN: R groin mattress suture removed, site clean and dry. No evidence of bleeding or hematoma formation. Site covered with 4x4 and Tegaderm.   EXTREMITIES:  2+ Peripheral Pulses, brisk capillary refill. No clubbing, cyanosis, or edema  NERVOUS SYSTEM:  Alert & Oriented X3, speech clear. No deficits     LABS:                        13.5   7.12  )-----------( 168      ( 18 Aug 2022 06:08 )             41.4       COUMADIN:  Yes/No.        DOSE:                  INDICATION:                GOAL INR:    PT/INR - ( 18 Aug 2022 06:08 )   PT: 14.7 sec;   INR: 1.23          PTT - ( 18 Aug 2022 06:08 )  PTT:28.0 sec    08-18    136  |  103  |  16  ----------------------------<  83  4.4   |  24  |  1.08    Ca    9.3      18 Aug 2022 06:08  Phos  3.1     08-17  Mg     1.9     08-17    TPro  6.9  /  Alb  4.2  /  TBili  0.5  /  DBili  x   /  AST  39  /  ALT  44  /  AlkPhos  80  08-18    Discharge CXR:  < from: Xray Chest 1 View- PORTABLE-Urgent (08.17.22 @ 20:55) >  FINDINGS:  Normal cardiomediastinal and hilar silhouettes. The lungs are clear. No   pleural effusions.No pneumothorax. No acute abnormalities of the soft   tissues and osseous structures.    IMPRESSION:  No acute pathology.    Discharge ECHO: pending      Patient discussed on morning rounds with Dr. Delgado    Operation / Date: Transcatheter closure of patent foramen ovale / 8/17/22    Surgeon: Dr. Delgado    Referring Physician: Dr. John Blanco     SUBJECTIVE ASSESSMENT AND HOSPITAL COURSE:  33 YO Male, , w/ no known prior PMHx initially presented to [facility] [place] in 4/2022 after experiencing an episode of tinnitus, dizziness, and vomiting when awaking from sleep. 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 Further w/u revealed +PFO confirmed by ECHO with bubble, EF 60-65%. JAROD was positive for PFO w/o thrombus. Of note, pt developed transaminitis after starting atorvastatin for CVA ppx, LFTs downtrended after stopping statin. Pt admits to taking multiple OTC supplements including amino acids, protein, and creatinine. Pt referred to Dr. Delgado for further work-up and intervention. On 8/17/22, patient underwent PFO closure, recovered as mini-ICU on [facility]. POD 1, patient down-graded to tele/step-down, echo bubble study revealed possible residual PFO, but no pericardial effusion. R groin sutures removed, tolerated well by patient. At present, patient admits to some discomfort at the R groin site w/ ambulation, but denies fever, chills, chest pain, SOB, palpitations, N/V. Patient cleared for discharge home by Dr. Delgado.     Vital Signs Last 24 Hrs  T(C): 36.5 (18 Aug 2022 09:11), Max: 36.5 (18 Aug 2022 09:11)  T(F): 97.7 (18 Aug 2022 09:11), Max: 97.7 (18 Aug 2022 09:11)  HR: 70 (18 Aug 2022 08:00) (57 - 71)  BP: 117/61 (18 Aug 2022 08:00) (102/56 - 124/58)  BP(mean): 84 (18 Aug 2022 08:00) (73 - 95)  RR: 18 (18 Aug 2022 07:00) (17 - 20)  SpO2: 95% (18 Aug 2022 08:00) (94% - 99%)    Parameters below as of 18 Aug 2022 08:00  Patient On (Oxygen Delivery Method): room air    EPICARDIAL WIRES REMOVED: YES  TIE DOWNS REMOVED: YES    PHYSICAL EXAM:  GENERAL: NAD, lying in bed comfortably  HEAD:  Atraumatic, Normocephalic  EYES: EOMI, PERRLA, conjunctiva and sclera clear  ENT: Moist mucous membranes  CHEST/LUNG: Clear to auscultation bilaterally; No rales, rhonchi, wheezing, or rubs. Unlabored respirations  HEART: Regular rate and rhythm; No murmurs, rubs, or gallops  ABDOMEN: Bowel sounds present; Soft, Nontender, Nondistended. No hepatomegally  GROIN: R groin mattress suture removed, site clean and dry. No evidence of bleeding or hematoma formation. Site covered with 4x4 and Tegaderm.   EXTREMITIES:  2+ Peripheral Pulses, brisk capillary refill. No clubbing, cyanosis, or edema  NERVOUS SYSTEM:  Alert & Oriented X3, speech clear. No deficits     LABS:                        13.5   7.12  )-----------( 168      ( 18 Aug 2022 06:08 )             41.4       COUMADIN:  Yes/No.        DOSE:                  INDICATION:                GOAL INR:    PT/INR - ( 18 Aug 2022 06:08 )   PT: 14.7 sec;   INR: 1.23          PTT - ( 18 Aug 2022 06:08 )  PTT:28.0 sec    08-18    136  |  103  |  16  ----------------------------<  83  4.4   |  24  |  1.08    Ca    9.3      18 Aug 2022 06:08  Phos  3.1     08-17  Mg     1.9     08-17    TPro  6.9  /  Alb  4.2  /  TBili  0.5  /  DBili  x   /  AST  39  /  ALT  44  /  AlkPhos  80  08-18    Discharge CXR:  < from: Xray Chest 1 View- PORTABLE-Urgent (08.17.22 @ 20:55) >  FINDINGS:  Normal cardiomediastinal and hilar silhouettes. The lungs are clear. No   pleural effusions.No pneumothorax. No acute abnormalities of the soft   tissues and osseous structures.    IMPRESSION:  No acute pathology.    Discharge ECHO:   < from: Echocardiogram w/ Bubble and Doppler (08.18.22 @ 09:25) >  -----  CONCLUSIONS:     1. Normal left and right ventricular size and systolic function.   2. No significant valvular disease.   3. No evidence of pulmonary hypertension.   4. No pericardial effusion.   5. Atrial septal occluder appears well seated with no significant   interatrial shunt by color flow Doppler. However, injection of agitated   saline via a peripheral vein reveals bubbles in the left heart within 3   heart beats, most consistent with a residual patent foramen ovale.         Patient discussed on morning rounds with Dr. Delgado    Operation / Date: Transcatheter closure of patent foramen ovale / 8/17/22    Surgeon: Dr. Delgado    Referring Physician: Dr. John Blanco     SUBJECTIVE ASSESSMENT AND HOSPITAL COURSE:  33 YO Male, , w/ no known prior PMHx initially presented to [facility] in 4/2022 after experiencing an episode of tinnitus, dizziness, and vomiting when awaking from sleep. 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 Further w/u revealed +PFO confirmed by ECHO with bubble, EF 60-65%. JAROD was positive for PFO w/o thrombus. Of note, pt developed transaminitis after starting atorvastatin for CVA ppx, LFTs downtrended after stopping statin. Pt admits to taking multiple OTC supplements including amino acids, protein, and creatinine. Pt referred to Dr. Deglado for further work-up and intervention. On 8/17/22, patient underwent PFO closure, recovered as mini-ICU on [facility]. POD 1, patient down-graded to tele/step-down, echo bubble study revealed possible residual PFO, but no pericardial effusion. R groin sutures removed, tolerated well by patient. At present, patient admits to some discomfort at the R groin site w/ ambulation, but denies fever, chills, chest pain, SOB, palpitations, N/V. Patient cleared for discharge home by Dr. Delgado.     Vital Signs Last 24 Hrs  T(C): 36.5 (18 Aug 2022 09:11), Max: 36.5 (18 Aug 2022 09:11)  T(F): 97.7 (18 Aug 2022 09:11), Max: 97.7 (18 Aug 2022 09:11)  HR: 70 (18 Aug 2022 08:00) (57 - 71)  BP: 117/61 (18 Aug 2022 08:00) (102/56 - 124/58)  BP(mean): 84 (18 Aug 2022 08:00) (73 - 95)  RR: 18 (18 Aug 2022 07:00) (17 - 20)  SpO2: 95% (18 Aug 2022 08:00) (94% - 99%)    Parameters below as of 18 Aug 2022 08:00  Patient On (Oxygen Delivery Method): room air    EPICARDIAL WIRES REMOVED: YES  TIE DOWNS REMOVED: YES    PHYSICAL EXAM:  GENERAL: NAD, lying in bed comfortably  HEAD:  Atraumatic, Normocephalic  EYES: EOMI, PERRLA, conjunctiva and sclera clear  ENT: Moist mucous membranes  CHEST/LUNG: Clear to auscultation bilaterally; No rales, rhonchi, wheezing, or rubs. Unlabored respirations  HEART: Regular rate and rhythm; No murmurs, rubs, or gallops  ABDOMEN: Bowel sounds present; Soft, Nontender, Nondistended. No hepatomegally  GROIN: R groin mattress suture removed, site clean and dry. No evidence of bleeding or hematoma formation. Site covered with 4x4 and Tegaderm.   EXTREMITIES:  2+ Peripheral Pulses, brisk capillary refill. No clubbing, cyanosis, or edema  NERVOUS SYSTEM:  Alert & Oriented X3, speech clear. No deficits     LABS:                        13.5   7.12  )-----------( 168      ( 18 Aug 2022 06:08 )             41.4       COUMADIN: No    PT/INR - ( 18 Aug 2022 06:08 )   PT: 14.7 sec;   INR: 1.23          PTT - ( 18 Aug 2022 06:08 )  PTT:28.0 sec    08-18    136  |  103  |  16  ----------------------------<  83  4.4   |  24  |  1.08    Ca    9.3      18 Aug 2022 06:08  Phos  3.1     08-17  Mg     1.9     08-17    TPro  6.9  /  Alb  4.2  /  TBili  0.5  /  DBili  x   /  AST  39  /  ALT  44  /  AlkPhos  80  08-18    Discharge CXR:  < from: Xray Chest 1 View- PORTABLE-Urgent (08.17.22 @ 20:55) >  FINDINGS:  Normal cardiomediastinal and hilar silhouettes. The lungs are clear. No   pleural effusions.No pneumothorax. No acute abnormalities of the soft   tissues and osseous structures.    IMPRESSION:  No acute pathology.    Discharge ECHO:   < from: Echocardiogram w/ Bubble and Doppler (08.18.22 @ 09:25) >  -----  CONCLUSIONS:     1. Normal left and right ventricular size and systolic function.   2. No significant valvular disease.   3. No evidence of pulmonary hypertension.   4. No pericardial effusion.   5. Atrial septal occluder appears well seated with no significant   interatrial shunt by color flow Doppler. However, injection of agitated   saline via a peripheral vein reveals bubbles in the left heart within 3   heart beats, most consistent with a residual patent foramen ovale.

## 2022-08-18 NOTE — DISCHARGE NOTE PROVIDER - CARE PROVIDERS DIRECT ADDRESSES
,[email or web address]

## 2022-08-18 NOTE — DISCHARGE NOTE NURSING/CASE MANAGEMENT/SOCIAL WORK - NSDCPETBCESMAN_GEN_ALL_CORE
tele lvn: notes



in bed with eyes close, no s/s of discomfort. needs attended. will continue to monitor. If you are a smoker, it is important for your health to stop smoking. Please be aware that second hand smoke is also harmful.

## 2022-08-18 NOTE — DISCHARGE NOTE NURSING/CASE MANAGEMENT/SOCIAL WORK - NSDCPEFALRISK_GEN_ALL_CORE
For information on Fall & Injury Prevention, visit: [email or web address] OR  [email or web address] OR  https://www.cdc.gov/steadi/patient.html

## 2022-08-18 NOTE — DISCHARGE NOTE PROVIDER - HOSPITAL COURSE
31 YO Male, , with recent finding of acute infarct in left superior cerebella artery territory and PFOs. Patient initially presented to [facility] in 4/2022 after experiencing an episode of tinnitus, dizziness, and vomiting when awaking from sleep. 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 Further w/u revealed +PFO confirmed by ECHO with bubble, EF 60-65%. JAROD was positive for PFO w/o thrombus. Of note, pt developed transaminitis after starting atorvastatin for CVA ppx, LFTs downtrended after stopping statin. Pt admits to taking multiple OTC supplements including amino acids, protein, and creatinine. Pt referred to Dr. Delgado for further work-up and intervention. On 8/17/22, patient underwent PFO closure, recovered as mini-ICU on [facility]. POD 1, patient down-graded to tele/step-down, TTE revealed ____, and R groin sutures removed. Patient cleared for discharge home by Dr. Delgado.    Over 35 minutes was spent with the patient reviewing the discharge material including medications, follow up appointments, recovery, concerning symptoms, and how to contact their health care providers if they have questions   33 YO Male, , w/ no known prior PMHx initially presented to [facility] in 4/2022 after experiencing an episode of tinnitus, dizziness, and vomiting when awaking from sleep. 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 Further w/u revealed +PFO confirmed by ECHO with bubble, EF 60-65%. JAROD was positive for PFO w/o thrombus. Of note, pt developed transaminitis after starting atorvastatin for CVA ppx, LFTs downtrended after stopping statin. Pt admits to taking multiple OTC supplements including amino acids, protein, and creatinine. Pt referred to Dr. Delgado for further work-up and intervention. On 8/17/22, patient underwent PFO closure, recovered as mini-ICU on [facility]. POD 1, patient down-graded to tele/step-down, TTE revealed ____, and R groin sutures removed. Patient cleared for discharge home by Dr. Delgado.    Over 35 minutes was spent with the patient reviewing the discharge material including medications, follow up appointments, recovery, concerning symptoms, and how to contact their health care providers if they have questions   31 YO Male, , w/ no known prior PMHx initially presented to [facility] in 4/2022 after experiencing an episode of tinnitus, dizziness, and vomiting when awaking from sleep. 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 Further w/u revealed +PFO confirmed by ECHO with bubble, EF 60-65%. JAROD was positive for PFO w/o thrombus. Of note, pt developed transaminitis after starting atorvastatin for CVA ppx, LFTs downtrended after stopping statin. Pt admits to taking multiple OTC supplements including amino acids, protein, and creatinine. Pt referred to Dr. Delgado for further work-up and intervention. On 8/17/22, patient underwent PFO closure, recovered as mini-ICU on [facility]. POD 1, patient down-graded to tele/step-down, Echo bubble study revealed possible residual PFO, but no pericardial effusion. Sutures removed from R groin. Results of echo discussed with Dr. Delgado and patient cleared for discharge home.     Over 35 minutes was spent with the patient reviewing the discharge material including medications, follow up appointments, recovery, concerning symptoms, and how to contact their health care providers if they have questions

## 2022-08-18 NOTE — DISCHARGE NOTE PROVIDER - NSDCMRMEDTOKEN_GEN_ALL_CORE_FT
aspirin 81 mg oral delayed release tablet: 1 tab(s) orally once a day   Aspirin Enteric Coated 325 mg oral delayed release tablet: 1 tab(s) orally once a day   Protonix 40 mg oral delayed release tablet: 1 tab(s) orally once a day

## 2022-08-18 NOTE — PROGRESS NOTE ADULT - ASSESSMENT
Niraj Delgado)  Cardiology; Interventional Cardiology  [address] [place] [address]  Phone: [phone]  Fax: [phone]  Follow Up Time:    [facility]  NEUROLOGY [address]  Scheduled Appointment: 10/13/2022   Med Reconciliation:  Medication Reconciliation Status	Admission Reconciliation is Completed  Discharge Reconciliation is Completed  	  Discharge Medications	Aspirin Enteric Coated 325 mg oral delayed release tablet: 1 tab(s) orally once a day   Protonix 40 mg oral delayed release tablet: 1 tab(s) orally once a day  	         Care Plan/Procedures:  Discharge Diagnoses, Assessment and Plan of Treatment	PRINCIPAL DISCHARGE DIAGNOSIS  Diagnosis: PFO (patent foramen ovale)  Assessment and Plan of Treatment:  Discharge Procedures, Findings and Treatment	PRINCIPAL PROCEDURE  Procedure: Transcatheter closure of patent foramen ovale (PFO)  Findings and Treatment:  Goal(s)	To get better and follow your care plan as instructed.     Follow Up:  Care Providers for Follow up (PCP/Outpatient Provider)	Niraj Delgado)  Cardiology; Interventional Cardiology  [address]  [place] [address]  Phone: [phone]  Fax: [phone]  Follow Up Time:  Patient's Scheduled Appointments	[facility]  NEUROLOGY [address]  Scheduled Appointment: 10/13/2022  Additional Scheduled Appointments	Please follow-up with Dr. Delgado in his office in 1 week. The office should call you with a follow-up appointment time. If you do not receive a call by tomorrow, please call the office.     Please follow-up with Dr. Blanco at scheduled appointment time.   Discharge Diet	Regular Diet - No restrictions  Activity	No heavy lifting/straining, Walking - Indoors allowed, Walking - Outdoors allowed, Follow Instructions Provided by your Surgical Team  Additional Instructions	-Take Aspirin 325mg daily as prescribed.     -Walk daily as tolerated and use your incentive spirometer 10 times every hour while you are awake.     -Please weigh yourself daily. If you notice over a 3 pound weight gain in 3 days, this is a sign you are likely retaining too much fluid. It is imperative you call our right away with unexplained rapid weight gain.      -Please continue to wear the compression stockings given to you in the hospital at home. This is a way to prevent fluid from building up in your legs.     -No driving or strenuous activity/exercise until cleared by your surgeon.    -Gently clean your incisions with unscented/antibacterial soap and water, pat dry.  You may leave them open to air.    -Call your doctor if you have shortness of breath, chest pain not relieved by pain medication, dizziness, fever >100.5, or increased redness or drainage from incisions.

## 2022-08-18 NOTE — DISCHARGE NOTE NURSING/CASE MANAGEMENT/SOCIAL WORK - PATIENT PORTAL LINK FT
Encounter Summary
  Created on: 2020
 
 SantosVeda
 External Reference #: [id number]
 : 42
 Sex: Female
 
 Demographics
 
 
+-----------------------+----------------------+
| Address               | [address]      |
|                       | [place]  [address] |
+-----------------------+----------------------+
| Home Phone            | [phone]      |
+-----------------------+----------------------+
| Preferred Language    | Unknown              |
+-----------------------+----------------------+
| Marital Status        |               |
+-----------------------+----------------------+
| Jain Affiliation | [id number]                 |
+-----------------------+----------------------+
| Race                  | Unknown              |
+-----------------------+----------------------+
| Ethnic Group          | Unknown              |
+-----------------------+----------------------+
 
 
 Author
 
 
+--------------+--------------------------------------------+
| Author       | [facility]  |
|              | [facility]                                |
+--------------+--------------------------------------------+
| Organization | [facility]  |
|              | [facility]                                |
+--------------+--------------------------------------------+
| Address      | Unknown                                    |
+--------------+--------------------------------------------+
| Phone        | Unavailable                                |
+--------------+--------------------------------------------+
 
 
 
 Support
 
 
+----------------+--------------+-----------------+-----------------+
| Name           | Relationship | Address         | Phone           |
+----------------+--------------+-----------------+-----------------+
| Johan Santos | [place]         | [address] | [phone] |
|                |              | [place]  [address] |                 |
+----------------+--------------+-----------------+-----------------+
 
 
 
 
 Care Team Providers
 
 
+-----------------------+------+-------------+
| Care Team Member Name | Role | Phone       |
+-----------------------+------+-------------+
 PCP  | Unavailable |
+-----------------------+------+-------------+
 
 
 
 Encounter Details
 
 
+--------+-----------+----------------------+-----------+-------------+
| Date   | Type      | Department           | Care Team | Description |
+--------+-----------+----------------------+-----------+-------------+
| / | [facility]  |   [facility] |           |             |
|    | Encounter |  MED CTR XRAY  [address] |           |             |
|        |           |  [address]  [place]       |           |             |
|        |           | [place] [address] |           |             |
|        |           |   [phone]       |           |             |
+--------+-----------+----------------------+-----------+-------------+
 
 
 
 Social History
 
 
+----------------+-------+-----------+--------+------+
| Tobacco Use    | Types | Packs/Day | Years  | Date |
|                |       |           | Used   |      |
+----------------+-------+-----------+--------+------+
| Never Assessed |       |           |        |      |
+----------------+-------+-----------+--------+------+
 
 
 
+------------------+---------------+
| Sex Assigned at  | Date Recorded |
| Birth            |               |
+------------------+---------------+
| Not on file      |               |
+------------------+---------------+
 
 
 
+----------------+-------------+-------------+
| Job Start Date | Occupation  | Industry    |
+----------------+-------------+-------------+
| Not on file    | Not on file | Not on file |
+----------------+-------------+-------------+
 
 
 
+----------------+--------------+------------+
| Travel History | Travel Start | Travel End |
+----------------+--------------+------------+
 
 
 
 
+-------------------------------------+
| No recent travel history available. |
+-------------------------------------+
 documented as of this encounter
 
 Plan of Treatment
 
 
+--------+---------+------------+----------------------+-------------+
| Date   | Type    | Specialty  | Care Team            | Description |
+--------+---------+------------+----------------------+-------------+
| / | Office  | Nephrology |   Dante Escudero MD  |             |
|    | Visit   |            |  [address]  [place]  |             |
|        |         |            | [address]  [place]   |             |
|        |         |            | [address]  [phone]  |             |
|        |         |            |   (Fax)  |             |
+--------+---------+------------+----------------------+-------------+
 documented as of this encounter
 
 Visit Diagnoses
 Not on filedocumented in this encounter You can access the FollowMyHealth Patient Portal offered by [facility] by registering at the following website: [email or web address]. By joining [facility]’s FollowMyHealth portal, you will also be able to view your health information using other applications (apps) compatible with our system.

## 2022-08-18 NOTE — DISCHARGE NOTE PROVIDER - CARE PROVIDER_API CALL
Niraj Delgado)  Cardiology; Interventional Cardiology  [address] [place] [address]  Phone: [phone]  Fax: [phone]  Follow Up Time:

## 2022-08-18 NOTE — DISCHARGE NOTE PROVIDER - NSDCFUADDINST_GEN_ALL_CORE_FT
-Walk daily as tolerated and use your incentive spirometer 10 times every hour while you are awake.     -Please weigh yourself daily. If you notice over a 3 pound weight gain in 3 days, this is a sign you are likely retaining too much fluid. It is imperative you call our right away with unexplained rapid weight gain.      -Please continue to wear the compression stockings given to you in the hospital at home. This is a way to prevent fluid from building up in your legs.     -No driving or strenuous activity/exercise until cleared by your surgeon.    -Gently clean your incisions with unscented/antibacterial soap and water, pat dry.  You may leave them open to air.    -Call your doctor if you have shortness of breath, chest pain not relieved by pain medication, dizziness, fever >100.5, or increased redness or drainage from incisions.   -Take Aspirin 325mg daily as prescribed.     -Walk daily as tolerated and use your incentive spirometer 10 times every hour while you are awake.     -Please weigh yourself daily. If you notice over a 3 pound weight gain in 3 days, this is a sign you are likely retaining too much fluid. It is imperative you call our right away with unexplained rapid weight gain.      -Please continue to wear the compression stockings given to you in the hospital at home. This is a way to prevent fluid from building up in your legs.     -No driving or strenuous activity/exercise until cleared by your surgeon.    -Gently clean your incisions with unscented/antibacterial soap and water, pat dry.  You may leave them open to air.    -Call your doctor if you have shortness of breath, chest pain not relieved by pain medication, dizziness, fever >100.5, or increased redness or drainage from incisions.

## 2022-08-24 DIAGNOSIS — I69.398 OTHER SEQUELAE OF CEREBRAL INFARCTION: ICD-10-CM

## 2022-08-24 DIAGNOSIS — R27.8 OTHER LACK OF COORDINATION: ICD-10-CM

## 2022-08-24 DIAGNOSIS — Z79.82 LONG TERM (CURRENT) USE OF ASPIRIN: ICD-10-CM

## 2022-08-24 DIAGNOSIS — R74.01 ELEVATION OF LEVELS OF LIVER TRANSAMINASE LEVELS: ICD-10-CM

## 2022-08-24 DIAGNOSIS — F41.9 ANXIETY DISORDER, UNSPECIFIED: ICD-10-CM

## 2022-08-24 DIAGNOSIS — Q21.1 ATRIAL SEPTAL DEFECT: ICD-10-CM

## 2022-08-29 ENCOUNTER — APPOINTMENT (OUTPATIENT)
Dept: CARDIOTHORACIC SURGERY | Facility: CLINIC | Age: 32
End: 2022-08-29

## 2022-08-29 PROCEDURE — 99213 OFFICE O/P EST LOW 20 MIN: CPT | Mod: 95

## 2022-09-06 ENCOUNTER — FORM ENCOUNTER (OUTPATIENT)
Age: 32
End: 2022-09-06

## 2022-09-06 NOTE — HISTORY OF PRESENT ILLNESS
[FreeTextEntry1] : This visit was provided via telehealth using real-time 2-way audio visual technology. The patient, ROBBIN WEN, was located at home, [address] [place] [address]\[place] [address] , at the time of the visit. The provider was located at [address] [place] [address]. The patient, ROBBIN WEN, Dr. Niraj Delgado and SHELLY GAN all participated in the telehealth encounter. Verbal consent given on 09/02/2022 by ROBBIN BEHZAD. 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  \par \par The procedure was uncomplicated and the patient was discharged home on post op day 1.\par \par Since discharge, the patient states he feels well. He started working out again, lifting weights, not too heavy. Denies chest pain, SOB, palpitations, dizziness, fevers, or chills. Groin sites healed. Compliant with medications.

## 2022-09-07 ENCOUNTER — OUTPATIENT (OUTPATIENT)
Dept: OUTPATIENT SERVICES | Facility: HOSPITAL | Age: 32
LOS: 1 days | End: 2022-09-07
Payer: MEDICAID

## 2022-09-07 ENCOUNTER — APPOINTMENT (OUTPATIENT)
Dept: CARDIOTHORACIC SURGERY | Facility: CLINIC | Age: 32
End: 2022-09-07

## 2022-09-07 VITALS
TEMPERATURE: 98 F | WEIGHT: 232 LBS | DIASTOLIC BLOOD PRESSURE: 65 MMHG | RESPIRATION RATE: 18 BRPM | HEART RATE: 57 BPM | OXYGEN SATURATION: 98 % | BODY MASS INDEX: 30.75 KG/M2 | HEIGHT: 73 IN | SYSTOLIC BLOOD PRESSURE: 126 MMHG

## 2022-09-07 DIAGNOSIS — Q21.1 ATRIAL SEPTAL DEFECT: ICD-10-CM

## 2022-09-07 PROCEDURE — 93306 TTE W/DOPPLER COMPLETE: CPT

## 2022-09-07 PROCEDURE — 76376 3D RENDER W/INTRP POSTPROCES: CPT | Mod: 26

## 2022-09-07 PROCEDURE — 93000 ELECTROCARDIOGRAM COMPLETE: CPT

## 2022-09-07 PROCEDURE — 93306 TTE W/DOPPLER COMPLETE: CPT | Mod: 26

## 2022-09-07 PROCEDURE — 99214 OFFICE O/P EST MOD 30 MIN: CPT

## 2022-09-15 NOTE — PHYSICAL EXAM

## 2022-09-15 NOTE — HISTORY OF PRESENT ILLNESS
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 He reported feeling a slight chest pain followed by SOB, and presents for a repeat echo with bubble study today. \par \par The procedure was uncomplicated and the patient was discharged home on post op day 1.\par \par Since discharge, the patient states he feels well. He started working out again, lifting weights, not too heavy. He reports that over the weekend he was lifting weights over his head and felt something in his chest, unable to further describe, but reports a feeling of SOB thereafter and slight headache. He rested and felt better and kept working out. Reports feeling anxious since the event. Denies chest pain, SOB, palpitations, dizziness, fevers, or chills. Groin sites healed. Compliant with medications. Has not seen a therapist.

## 2022-09-15 NOTE — REVIEW OF SYSTEMS
[Anxiety] : anxiety [Under Stress] : under stress [Negative] : Heme/Lymph [de-identified] : +Left hand coordination deficit

## 2022-09-19 ENCOUNTER — NON-APPOINTMENT (OUTPATIENT)
Age: 32
End: 2022-09-19

## 2022-09-26 ENCOUNTER — APPOINTMENT (OUTPATIENT)
Dept: CARDIOTHORACIC SURGERY | Facility: CLINIC | Age: 32
End: 2022-09-26

## 2022-09-26 VITALS
HEART RATE: 70 BPM | HEIGHT: 73 IN | WEIGHT: 228 LBS | SYSTOLIC BLOOD PRESSURE: 113 MMHG | DIASTOLIC BLOOD PRESSURE: 66 MMHG | RESPIRATION RATE: 18 BRPM | BODY MASS INDEX: 30.22 KG/M2 | OXYGEN SATURATION: 97 % | TEMPERATURE: 98 F

## 2022-09-26 PROCEDURE — 99213 OFFICE O/P EST LOW 20 MIN: CPT

## 2022-09-30 NOTE — HISTORY OF PRESENT ILLNESS
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 TTE performed 9/7/22 showed ASO in place without color doppler flow/+bubble study. Since his last visit, the patient states that he has resumed physical activity/exercise. He reports occassional strange sensation in chest that last secs, occurring daily. Can occur during his gym activities but not during activity, periods of rest. Possible transient palpitation per report but denies chest pain, sob, pnd/orthopnea, dizzyness, or any other associated symptoms; he otherwise feels well. He does report some possible GI upset as he remains compliant with ASA.

## 2022-09-30 NOTE — PHYSICAL EXAM

## 2022-09-30 NOTE — REVIEW OF SYSTEMS
[Anxiety] : anxiety [Under Stress] : under stress [Negative] : Heme/Lymph [de-identified] : +Left hand coordination deficit

## 2022-10-05 NOTE — HISTORY OF PRESENT ILLNESS
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 TTE performed 9/7/22 showed ASO in place without color doppler flow/+bubble study. Since his last visit, the patient states that he has resumed physical activity/exercise. He reports occasional strange sensation in chest that last secs, occurring daily. Can occur during his gym activities but not during activity, periods of rest. Possible transient palpitation per report but denies chest pain, sob, pnd/orthopnea, dizzyness, or any other associated symptoms; he otherwise feels well. He does report some possible GI upset as he remains compliant with ASA.

## 2022-10-05 NOTE — REVIEW OF SYSTEMS
[Anxiety] : anxiety [Under Stress] : under stress [Negative] : Heme/Lymph [de-identified] : +Left hand coordination deficit

## 2022-10-05 NOTE — PHYSICAL EXAM

## 2022-10-10 ENCOUNTER — APPOINTMENT (OUTPATIENT)
Dept: CARDIOTHORACIC SURGERY | Facility: CLINIC | Age: 32
End: 2022-10-10

## 2022-10-13 ENCOUNTER — APPOINTMENT (OUTPATIENT)
Dept: NEUROLOGY | Facility: CLINIC | Age: 32
End: 2022-10-13

## 2022-10-13 VITALS
BODY MASS INDEX: 28.23 KG/M2 | OXYGEN SATURATION: 97 % | HEIGHT: 74 IN | WEIGHT: 220 LBS | TEMPERATURE: 98.3 F | SYSTOLIC BLOOD PRESSURE: 126 MMHG | HEART RATE: 58 BPM | DIASTOLIC BLOOD PRESSURE: 73 MMHG

## 2022-10-13 PROCEDURE — 99215 OFFICE O/P EST HI 40 MIN: CPT

## 2022-10-13 RX ORDER — OMEPRAZOLE 20 MG/1
20 TABLET, DELAYED RELEASE ORAL DAILY
Qty: 30 | Refills: 3 | Status: DISCONTINUED | COMMUNITY
Start: 2022-09-29 | End: 2022-10-13

## 2022-10-13 RX ORDER — APIXABAN 5 MG/1
5 TABLET, FILM COATED ORAL
Qty: 4 | Refills: 0 | Status: DISCONTINUED | COMMUNITY
Start: 2022-07-14 | End: 2022-10-13

## 2022-10-13 RX ORDER — ASPIRIN 325 MG/1
325 TABLET, FILM COATED ORAL DAILY
Qty: 90 | Refills: 0 | Status: DISCONTINUED | COMMUNITY
End: 2022-10-13

## 2022-12-18 NOTE — DISCUSSION/SUMMARY
[FreeTextEntry1] : Returned call to NP ([facility]) at rehab as requested, went to , left detailed message for CB.

## 2022-12-18 NOTE — LETTER BODY
[To Whom it May Concern:] : To Whom it May Concern: [FreeTextEntry1] : This letter is to certify that Jean Renae is a patient at [facility] for his recent cerebellar stroke. He is currently on ASA 81mg daily for secondary stroke prevention. While he is neurologically cleared for PFO closure, there are general safety considerations that apply to patients with past stroke who are on antiplatelet medications. \par From a neurovascular standpoint, optimally it would be beneficial for him to continue antiplatelet therapy throughout the rafaela -procedural period. If his antiplatelet therapy needs to be discontinued prior to the surgical procedure, then he would be at an increased risk for stroke during that time interval.  If antiplatelet therapy needs to be stopped, it should be withheld for the shortest duration of time possible and should be restarted as soon as possible as deemed safe by the surgical team. Increased risk of thromboembolic complications such as stroke during the period that antiplatelet therapy is withheld was discussed with patient in detail. It is also advised to maintain normotension and avoid any episodes of hypotension in the rafaela-procedural period.\par If you have any additional questions please feel free to contact me at [phone].  [FreeTextEntry3] : Indira Arteaga AGACNP-C\par Nurse Practitioner\par [facility] \par Department of Neurology\par [address]\par [place] [id number]\par Tel: [phone]\par Fax: [phone]\par Email: [email or web address] \par

## 2022-12-18 NOTE — HISTORY OF PRESENT ILLNESS
[FreeTextEntry1] : Interval: PFO closure august 17th . \par He has done well with therapy. \par he is here for follow up of his stroke and post PFO closure. \par Currently just on asa 81\par \par \par HPI: Pt is a 30 y/o M, , with no significant PMHx who presents today for hospital f/u visit. He initially presented to [facility] April 23rd after experiencing an episode of tinnitus, dizziness, and vomiting when awaking from sleep. Pt wife called EMS who brought patient to [facility]. While there, patient underwent CT head in ED which was negative. However, subsequent CTA H/N revealed acute infarct in left superior cerebellar artery territory. Pt was admitted to ICU for closer monitoring.  MRI confirmed acute infarct involving the left superior cerebellar artery territory, multiple old strokes . Pt also treated for vertigo symptoms. Further w/u revealed +PFO confirmed by ECHO with bubble, EF 60-65%. JAROD positive for PFO, no thrombus. Of note, pt developed transaminitis after starting atorvastatin for CVA ppx. Pt admits to taking multiple OTC supplements including amino acids, protein, and creatine. LFTs downtrended after stopping atorvastatin. Given +PFO with CVA, pt transferred to [facility] for further evaluation for possible intervention. Stroke team and hematology teams were consulted for further workup of CVA. Echo completed and showed mild MR, no pericardial effusion, and EF of 55%. Statin held 2/2 increased LFTs at OSH. Patient d/c to rehab with MCOT in place. Pt is happy to report he is significantly improved after extensive rehab. He was d/c home 2 days ago. He still has residual poor coordination in L hand as well as worse balance, but these are much better. He denies any new stroke-like episodes. He is still on ASA 81mg daily, although his plavix was stopped about 1 week ago while still at rehab for continued rise in his LFTs despite being off of his statin. Repeat BW showed resolution of his elevated LFTs. (He had repeat BW 2 days ago). \par  \par

## 2022-12-18 NOTE — ASSESSMENT
[FreeTextEntry1] : Pt is a 32yo Male, , with no prior PMHx presents for hospital f/u. He initially presented to [facility] on 4/23 after experiencing an episode of acute R ear tinnitus, dizziness, and vomiting when awaking from sleep, found to have acute to subacute left SCA infarct on CTA h/n and MRI with residual left sided dysmetria and ataxia, c/b transaminitis after starting statin, transferred to [facility] under CTSx for +PFO found on JAROD. Now pending PFO closure with Dr. Delgado. MCOT in place. \par \par Plan \par \par #1 CVA\par Continue Aspirin 81mg daily for secondary stroke prevention. Will discuss with Dr. Floyd and Dr. Delgado re: antiplatelets (increase ASA to full ASA??) now that he is off plavix... \par F/u with heme to r/o coagulopathy \par F/u with CTSX for PFO closure \par Will consider referring pt to EP for ILR placement if short term monitoring reveals no arrhythmias... (I have recently been finding more PAF on younger patients so would err on the side of caution...) \par Will refer to GI for persistently elevated LFTs \par Referrals for OT and VT for balance and L hand incoordination after stroke \par Pt requested to send in discs of MRIs and imaging from [facility] to be uploaded to PACs. \par -Counselled on healthy eating (DASH/ Mediterranean diet, limiting red meats)\par -Counselled on importance of regular exercise and remaining active\par -Continue to f/u with PCP regarding regular health maintenance and prevention, including routine screening \par -Counselled on signs of stroke BEFAST and to call 911 with any new or worsening neurological symptoms \par

## 2022-12-21 ENCOUNTER — FORM ENCOUNTER (OUTPATIENT)
Age: 32
End: 2022-12-21

## 2022-12-22 ENCOUNTER — APPOINTMENT (OUTPATIENT)
Dept: CARDIOTHORACIC SURGERY | Facility: CLINIC | Age: 32
End: 2022-12-22

## 2022-12-22 ENCOUNTER — OUTPATIENT (OUTPATIENT)
Dept: OUTPATIENT SERVICES | Facility: HOSPITAL | Age: 32
LOS: 1 days | End: 2022-12-22
Payer: MEDICAID

## 2022-12-22 VITALS
WEIGHT: 205 LBS | HEIGHT: 74 IN | BODY MASS INDEX: 26.31 KG/M2 | HEART RATE: 68 BPM | OXYGEN SATURATION: 97 % | RESPIRATION RATE: 18 BRPM | DIASTOLIC BLOOD PRESSURE: 63 MMHG | SYSTOLIC BLOOD PRESSURE: 130 MMHG | TEMPERATURE: 98 F

## 2022-12-22 DIAGNOSIS — Q21.10 ATRIAL SEPTAL DEFECT, UNSPECIFIED: ICD-10-CM

## 2022-12-22 PROCEDURE — 93306 TTE W/DOPPLER COMPLETE: CPT

## 2022-12-22 PROCEDURE — 99213 OFFICE O/P EST LOW 20 MIN: CPT

## 2022-12-22 PROCEDURE — 93306 TTE W/DOPPLER COMPLETE: CPT | Mod: 26

## 2023-09-18 ENCOUNTER — OUTPATIENT (OUTPATIENT)
Dept: OUTPATIENT SERVICES | Facility: HOSPITAL | Age: 33
LOS: 1 days | End: 2023-09-18
Payer: MEDICAID

## 2023-09-18 DIAGNOSIS — Q21.10 ATRIAL SEPTAL DEFECT, UNSPECIFIED: ICD-10-CM

## 2023-09-18 PROCEDURE — 93306 TTE W/DOPPLER COMPLETE: CPT | Mod: 26

## 2023-09-18 PROCEDURE — 93306 TTE W/DOPPLER COMPLETE: CPT

## 2023-09-19 ENCOUNTER — APPOINTMENT (OUTPATIENT)
Dept: CARDIOTHORACIC SURGERY | Facility: CLINIC | Age: 33
End: 2023-09-19
Payer: MEDICAID

## 2023-09-19 PROCEDURE — 99213 OFFICE O/P EST LOW 20 MIN: CPT | Mod: 95

## 2023-10-13 ENCOUNTER — APPOINTMENT (OUTPATIENT)
Dept: NEUROLOGY | Facility: CLINIC | Age: 33
End: 2023-10-13
Payer: MEDICAID

## 2023-10-13 VITALS
RESPIRATION RATE: 99 BRPM | SYSTOLIC BLOOD PRESSURE: 126 MMHG | DIASTOLIC BLOOD PRESSURE: 75 MMHG | BODY MASS INDEX: 27.46 KG/M2 | TEMPERATURE: 97.8 F | WEIGHT: 214 LBS | HEIGHT: 74 IN | HEART RATE: 52 BPM

## 2023-10-13 DIAGNOSIS — Q21.12 PATENT FORAMEN OVALE: ICD-10-CM

## 2023-10-13 DIAGNOSIS — I63.9 CEREBRAL INFARCTION, UNSPECIFIED: ICD-10-CM

## 2023-10-13 PROCEDURE — 99214 OFFICE O/P EST MOD 30 MIN: CPT

## 2023-11-07 PROBLEM — Q21.12 PFO (PATENT FORAMEN OVALE): Status: ACTIVE | Noted: 2022-05-24

## 2024-05-29 ENCOUNTER — APPOINTMENT (OUTPATIENT)
Dept: HEART AND VASCULAR | Facility: CLINIC | Age: 34
End: 2024-05-29
Payer: COMMERCIAL

## 2024-05-29 ENCOUNTER — NON-APPOINTMENT (OUTPATIENT)
Age: 34
End: 2024-05-29

## 2024-05-29 VITALS
HEART RATE: 65 BPM | OXYGEN SATURATION: 99 % | DIASTOLIC BLOOD PRESSURE: 68 MMHG | TEMPERATURE: 98.7 F | HEIGHT: 74 IN | WEIGHT: 210 LBS | SYSTOLIC BLOOD PRESSURE: 128 MMHG | BODY MASS INDEX: 26.95 KG/M2

## 2024-05-29 DIAGNOSIS — Z01.810 ENCOUNTER FOR PREPROCEDURAL CARDIOVASCULAR EXAMINATION: ICD-10-CM

## 2024-05-29 DIAGNOSIS — Z87.74 PERSONAL HISTORY OF (CORRECTED) CONGENITAL MALFORMATIONS OF HEART AND CIRCULATORY SYSTEM: ICD-10-CM

## 2024-05-29 PROCEDURE — 36415 COLL VENOUS BLD VENIPUNCTURE: CPT

## 2024-05-29 PROCEDURE — 99214 OFFICE O/P EST MOD 30 MIN: CPT | Mod: 25

## 2024-05-29 PROCEDURE — 93000 ELECTROCARDIOGRAM COMPLETE: CPT

## 2024-05-29 NOTE — REASON FOR VISIT
[FreeTextEntry1] : 32 y/o M s/p cerebellar CVA 2/T a PFO. s/p PFO closure with Dr Delgado 2022. Has a moderate residual shunt by echo.  Despite this his ASA was DCed.  SH- Dr Delgado Neuro- Dr Calvo

## 2024-05-29 NOTE — PHYSICAL EXAM
[Well Developed] : well developed [Well Nourished] : well nourished [No Acute Distress] : no acute distress [Normal Conjunctiva] : normal conjunctiva [Normal Venous Pressure] : normal venous pressure [No Carotid Bruit] : no carotid bruit [Normal S1, S2] : normal S1, S2 [No Murmur] : no murmur [No Rub] : no rub [No Gallop] : no gallop [Clear Lung Fields] : clear lung fields [Good Air Entry] : good air entry [No Respiratory Distress] : no respiratory distress  [Soft] : abdomen soft [Non Tender] : non-tender [Normal Bowel Sounds] : normal bowel sounds [Normal Gait] : normal gait [No Edema] : no edema [No Cyanosis] : no cyanosis [No Clubbing] : no clubbing [No Varicosities] : no varicosities [No Rash] : no rash [No Skin Lesions] : no skin lesions [Moves all extremities] : moves all extremities [No Focal Deficits] : no focal deficits [Normal Speech] : normal speech [Alert and Oriented] : alert and oriented [Normal memory] : normal memory [de-identified] : Umbilical hernia

## 2024-05-29 NOTE — ASSESSMENT
[FreeTextEntry1] : Preop- excellent functional status, pt cleared to proceed.   Labs were drawn today in Office.   PFO closure- Echo Sept 2023 with moderate residual shunt on bubble study  Old CVA- Cerebellar

## 2024-05-29 NOTE — HISTORY OF PRESENT ILLNESS
[FreeTextEntry1] : 34 y/o M s/p cerebellar CVA 2/T a PFO. s/p PFO closure with Dr Delgado.  Has a moderate residual shunt by echo.  ASA has been discontinued.  5/29/24  Having umbilical hernia repaired.  [place] [facility] 6/17/24.  Very fit.  Does not take any prescription meds, only a protein powder and creatine.  EKG: NSR, normal axis and intervals, no ST-Tw abnormalities.

## 2024-05-30 LAB
ALBUMIN SERPL ELPH-MCNC: 4.6 G/DL
ALP BLD-CCNC: 73 U/L
ALT SERPL-CCNC: 43 U/L
ANION GAP SERPL CALC-SCNC: 14 MMOL/L
APTT BLD: 25.4 SEC
AST SERPL-CCNC: 41 U/L
BASOPHILS # BLD AUTO: 0.03 K/UL
BASOPHILS NFR BLD AUTO: 0.7 %
BILIRUB SERPL-MCNC: 0.3 MG/DL
BUN SERPL-MCNC: 23 MG/DL
CALCIUM SERPL-MCNC: 10.2 MG/DL
CHLORIDE SERPL-SCNC: 100 MMOL/L
CO2 SERPL-SCNC: 24 MMOL/L
CREAT SERPL-MCNC: 1.4 MG/DL
EGFR: 68 ML/MIN/1.73M2
EOSINOPHIL # BLD AUTO: 0.25 K/UL
EOSINOPHIL NFR BLD AUTO: 5.6 %
GLUCOSE SERPL-MCNC: 84 MG/DL
HCT VFR BLD CALC: 41.7 %
HGB BLD-MCNC: 14.1 G/DL
IMM GRANULOCYTES NFR BLD AUTO: 0.2 %
INR PPP: 1.13 RATIO
LYMPHOCYTES # BLD AUTO: 1.69 K/UL
LYMPHOCYTES NFR BLD AUTO: 37.8 %
MAN DIFF?: NORMAL
MCHC RBC-ENTMCNC: 27.8 PG
MCHC RBC-ENTMCNC: 33.8 GM/DL
MCV RBC AUTO: 82.1 FL
MONOCYTES # BLD AUTO: 0.4 K/UL
MONOCYTES NFR BLD AUTO: 8.9 %
NEUTROPHILS # BLD AUTO: 2.09 K/UL
NEUTROPHILS NFR BLD AUTO: 46.8 %
PLATELET # BLD AUTO: 224 K/UL
POTASSIUM SERPL-SCNC: 4.2 MMOL/L
PROT SERPL-MCNC: 7.4 G/DL
PT BLD: 12.8 SEC
RBC # BLD: 5.08 M/UL
RBC # FLD: 13.7 %
SODIUM SERPL-SCNC: 138 MMOL/L
WBC # FLD AUTO: 4.47 K/UL

## 2024-09-09 ENCOUNTER — APPOINTMENT (OUTPATIENT)
Dept: CARDIOTHORACIC SURGERY | Facility: CLINIC | Age: 34
End: 2024-09-09

## 2024-09-22 ENCOUNTER — NON-APPOINTMENT (OUTPATIENT)
Age: 34
End: 2024-09-22

## 2024-09-23 ENCOUNTER — RESULT REVIEW (OUTPATIENT)
Age: 34
End: 2024-09-23

## 2024-09-23 ENCOUNTER — OUTPATIENT (OUTPATIENT)
Dept: OUTPATIENT SERVICES | Facility: HOSPITAL | Age: 34
LOS: 1 days | End: 2024-09-23
Payer: COMMERCIAL

## 2024-09-23 ENCOUNTER — APPOINTMENT (OUTPATIENT)
Dept: CARDIOTHORACIC SURGERY | Facility: CLINIC | Age: 34
End: 2024-09-23
Payer: COMMERCIAL

## 2024-09-23 VITALS
SYSTOLIC BLOOD PRESSURE: 129 MMHG | WEIGHT: 216 LBS | HEIGHT: 74 IN | TEMPERATURE: 97.5 F | HEART RATE: 68 BPM | BODY MASS INDEX: 27.72 KG/M2 | DIASTOLIC BLOOD PRESSURE: 64 MMHG | OXYGEN SATURATION: 99 %

## 2024-09-23 DIAGNOSIS — Q21.12 PATENT FORAMEN OVALE: ICD-10-CM

## 2024-09-23 DIAGNOSIS — Z87.74 PERSONAL HISTORY OF (CORRECTED) CONGENITAL MALFORMATIONS OF HEART AND CIRCULATORY SYSTEM: ICD-10-CM

## 2024-09-23 PROCEDURE — 93306 TTE W/DOPPLER COMPLETE: CPT

## 2024-09-23 PROCEDURE — 93306 TTE W/DOPPLER COMPLETE: CPT | Mod: 26

## 2024-09-23 PROCEDURE — 99213 OFFICE O/P EST LOW 20 MIN: CPT

## 2024-09-23 RX ORDER — AMOXICILLIN 500 MG/1
500 TABLET, FILM COATED ORAL
Qty: 4 | Refills: 3 | Status: ACTIVE | COMMUNITY
Start: 2024-09-23 | End: 1900-01-01

## 2024-09-25 NOTE — ASSESSMENT
[FreeTextEntry1] : 34M with PMH of cerebellar CVA in April 2022 and patent foramen ovale s/p PFO closure s/p 08/17/2022 who presents for routine follow up. Dr. Delgado have independently seen and evaluated the patient in this face-to-face encounter. Echocardiogram today showed normal BiV function, PFO occluder is well seated with bubbles noted in the left heart consistent with a small residual rafaela-device leak. The intracardiac shunt appears improved compared to prior TTE on 9/18/23. Results discussed with patient. Dr. Delgado recommends a repeat TTE with bubble study in 1 year to reassess. All questions were addressed.

## 2024-09-25 NOTE — REVIEW OF SYSTEMS
[Dizziness] : dizziness [Negative] : Heme/Lymph [de-identified] : residual occasional mumble speech and left hand fine motor deficits

## 2024-09-25 NOTE — RESULTS/DATA
[TextEntry] : ECHO, 12/22/2022: Mildly dilated left ventricular size. Normal left ventricular systolic function. Normal right ventricular size and systolic function. Biatrial enlargement. Mild-to-moderate mitral regurgitation. Atrial septal occluder device appears well seated. Injection of agitated saline via a peripheral vein reveals bubbles in the left heart within 3 heart beats, consistent with residual shunt. No evidence of pulmonary hypertension. Compared to the previous TTE performed on 9/7/2022, there have been no significant interval changes.   ECHO, 9/18/23:  Atrial septal occluder device appears well seated. Injection of agitated saline via a peripheral vein reveals bubbles in the left heart consistent with moderate residual shunt. Normal left and right ventricular size and systolic function. Normal atria. Mild-to-moderate mitral regurgitation. Mild-to-moderate tricuspid regurgitation. No evidence of pulmonary hypertension. No pericardial effusion. Compared to the previous TTE performed on 12/22/2022, there have been no significant interval changes.  TTE 9/23/24 CONCLUSIONS:  1. Normal left and right ventricular size and systolic function.  2. No significant valvular disease.  3. No evidence of pulmonary hypertension.  4. No pericardial effusion.  5. Atrial septal occluder appears well seated with bubbles noted in the left heart consistent with a small residual rafaela-device leak. The intracardiac shunt appears improved compared to prior TTE on 9/18/23.

## 2024-09-25 NOTE — HISTORY OF PRESENT ILLNESS
[FreeTextEntry1] : Neurologist: Dr. Blanco   34M with PMH of cerebellar CVA in April 2022 and patent foramen ovale s/p PFO closure s/p 08/17/2022 who presents for routine follow up.   Since last visit, he saw Dr. Sarmiento in May 2024 for cardiology clearance for his abdominal hernia surgery that was performed in June 2024.   Last seen in [facility] in 9/2023, since patient had stopped taking his ASA per Dr. Blanco's recommendation. He continues to feel well, active, no CV complain. 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 He is in the process of looking for a speech therapist to improve his occasional mumbled speech that was exacerbated by his stroke event, has occ LH that he attributes to his stroke event, and has problems with fine motor skills with his left hand. He has had any recurrence of his stroke.

## 2024-09-25 NOTE — HISTORY OF PRESENT ILLNESS
[FreeTextEntry1] : Neurologist: Dr. Blanco   34M with PMH of cerebellar CVA in April 2022 and patent foramen ovale s/p PFO closure s/p 08/17/2022 who presents for routine follow up.   Since last visit, he saw Dr. Sarmiento in May 2024 for cardiology clearance for his abdominal hernia surgery that was performed in June 2024.   Last seen in [facility] in 9/2023, since patient had stopped taking his ASA per Dr. Blanco's recommendation. He continues to feel well, active, no CV complain. 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 He is in the process of looking for a speech therapist to improve his occasional mumbled speech that was exacerbated by his stroke event, has occ LH that he attributes to his stroke event, and has problems with fine motor skills with his left hand. He has had any recurrence of his stroke.

## 2024-09-25 NOTE — REVIEW OF SYSTEMS
[Dizziness] : dizziness [Negative] : Heme/Lymph [de-identified] : residual occasional mumble speech and left hand fine motor deficits

## 2024-09-25 NOTE — REVIEW OF SYSTEMS
[Dizziness] : dizziness [Negative] : Heme/Lymph [de-identified] : residual occasional mumble speech and left hand fine motor deficits

## 2024-09-25 NOTE — PLAN
[TextEntry] : - IE Abx ppx 30-60 min prior to dental procedure - return to [facility] in 1 year with TTE with bubble study followed by a visit with Dr. Delgado or as needed.   I, Dr. Niraj Delgado, personally performed the evaluation and management (E/M) services for this established patient who presents today with (a) new problem(s)/exacerbation of (an) existing condition(s). That E/M includes conducting the clinically appropriate interval history &/or exam, assessing all new/exacerbated conditions, and establishing a new plan of care. Today, my WILLIAM, noted herewith, was here to observe my evaluation and management service for this new problem/exacerbated condition and follow the plan of care established by me going forward.

## (undated) DEVICE — DRAPE OR CAMERA COVER

## (undated) DEVICE — TUBING EXTENSION HI PRESSURE FLEX 48"

## (undated) DEVICE — CUFF FINGER CLEARSIGHT LG

## (undated) DEVICE — KIT TRANSDUCER ARTERIAL 60P 152CM DISP

## (undated) DEVICE — DRSG QUICKCLOT HEMOSTATIC 4X4 FOIL

## (undated) DEVICE — ELCTR ZOLL DEFIBRILLATOR PAD NO REPLACEMENT

## (undated) DEVICE — DRAPE ULTRASOUND TRANSDUCER COVER

## (undated) DEVICE — SUT VICRYL 2-0 27" CT-1

## (undated) DEVICE — PLASTIC SOLUTION BOWL 160Z

## (undated) DEVICE — Device

## (undated) DEVICE — WARMING BLANKET LOWER ADULT

## (undated) DEVICE — DRAPE ULTRASOUND PROBE COVER CATH FAMILY CONNECTOR